# Patient Record
Sex: MALE | Race: OTHER | HISPANIC OR LATINO
[De-identification: names, ages, dates, MRNs, and addresses within clinical notes are randomized per-mention and may not be internally consistent; named-entity substitution may affect disease eponyms.]

---

## 2022-02-14 ENCOUNTER — NON-APPOINTMENT (OUTPATIENT)
Age: 42
End: 2022-02-14

## 2022-03-03 ENCOUNTER — TRANSCRIPTION ENCOUNTER (OUTPATIENT)
Age: 42
End: 2022-03-03

## 2022-03-15 ENCOUNTER — APPOINTMENT (OUTPATIENT)
Dept: PULMONOLOGY | Facility: CLINIC | Age: 42
End: 2022-03-15
Payer: COMMERCIAL

## 2022-03-15 DIAGNOSIS — I10 ESSENTIAL (PRIMARY) HYPERTENSION: ICD-10-CM

## 2022-03-15 DIAGNOSIS — E78.5 HYPERLIPIDEMIA, UNSPECIFIED: ICD-10-CM

## 2022-03-15 PROCEDURE — 99203 OFFICE O/P NEW LOW 30 MIN: CPT

## 2022-03-15 NOTE — ASSESSMENT
[FreeTextEntry1] : SOB: persistent SOB post- covid. - F/u PFTs with exhaled nitric oxide study, \par \par Cough: treat for PND, GERD for now. Start Zyal  and Flonase. Consider Ct chest if cough does not improve on these medications given  rheumatological symptoms r/o sarcoidosis. \par \par Lucía HUDSON NP, am scribing for and in the presence of Dr. Wesley Bridges, the following sections HISTORY OF PRESENT ILLNESS, PAST MEDICAL/FAMILY/SOCIAL HISTORY; REVIEW OF SYSTEMS; VITAL SIGNS; PHYSICAL EXAM; DISPOSITION.\par \par

## 2022-03-15 NOTE — HISTORY OF PRESENT ILLNESS
[Never] : never [TextBox_4] : Pt here for pulmonary eval of SOB and cough post Covid 19 infection (July 2020). Since his Covid infection, he has been on Qvar for cough and SOB x 1 year and recently ran out. Prior imaging of CXR has been negative. He SOB has improved but he is not at his baseline pre-Covid state. He c/o recurrent episode of wet cough upon awakening in the morning. Denies hx of pulmonary disease- he has had a home sleep studies for snoring which was reported normal. He has a hx of seasonal allergies which he ins on Allegra -D prn and + refulx symptoms on pantoprazole most days. \par \par \par He has seen rheumatology for gum swelling and redness with  non- specific granulomatous findings in gums and joint pains. All work up and eval done in [place] prior to moving to [place].\par \par Employed as pediatric pulmonologist.\par \par

## 2022-03-17 ENCOUNTER — APPOINTMENT (OUTPATIENT)
Dept: PULMONOLOGY | Facility: CLINIC | Age: 42
End: 2022-03-17
Payer: COMMERCIAL

## 2022-03-17 PROCEDURE — 94729 DIFFUSING CAPACITY: CPT

## 2022-03-17 PROCEDURE — 94010 BREATHING CAPACITY TEST: CPT

## 2022-03-17 PROCEDURE — 94726 PLETHYSMOGRAPHY LUNG VOLUMES: CPT

## 2022-03-17 PROCEDURE — 95012 NITRIC OXIDE EXP GAS DETER: CPT

## 2022-04-12 ENCOUNTER — APPOINTMENT (OUTPATIENT)
Dept: PULMONOLOGY | Facility: CLINIC | Age: 42
End: 2022-04-12
Payer: COMMERCIAL

## 2022-04-12 VITALS
WEIGHT: 180 LBS | RESPIRATION RATE: 16 BRPM | HEART RATE: 105 BPM | BODY MASS INDEX: 26.66 KG/M2 | SYSTOLIC BLOOD PRESSURE: 130 MMHG | OXYGEN SATURATION: 95 % | DIASTOLIC BLOOD PRESSURE: 83 MMHG | TEMPERATURE: 98 F | HEIGHT: 69 IN

## 2022-04-12 PROCEDURE — 99214 OFFICE O/P EST MOD 30 MIN: CPT

## 2022-04-12 NOTE — PHYSICAL EXAM
[No Acute Distress] : no acute distress [Normal Rate/Rhythm] : normal rate/rhythm [Normal S1, S2] : normal s1, s2 [No Resp Distress] : no resp distress [Clear to Auscultation Bilaterally] : clear to auscultation bilaterally [Oriented x3] : oriented x3

## 2022-04-12 NOTE — HISTORY OF PRESENT ILLNESS
[Never] : never [TextBox_4] : Pt here for follow up cough/ SOB- resumed Flovent for symptoms 2 days ago without notable difference in exercise tolerance/ sob. Able to run 2.5 miles the other day. Reports flonase has helped with his congestion but experiences some mild epistaxis.\par \par 3/15/2022:\par Pt here for pulmonary eval of SOB and cough post Covid 19 infection (July 2020). Since his Covid infection, he has been on Qvar for cough and SOB x 1 year and recently ran out. Prior imaging of CXR has been negative. He SOB has improved but he is not at his baseline pre-Covid state. He c/o recurrent episode of wet cough upon awakening in the morning. Denies hx of pulmonary disease- he has had a home sleep studies for snoring which was reported normal. He has a hx of seasonal allergies which he ins on Allegra -D prn and + refulx symptoms on pantoprazole most days. \par \par \par He has seen rheumatology for gum swelling and redness with  non- specific granulomatous findings in gums and joint pains. All work up and eval done in [place] prior to moving to [place].\par \par Employed as pediatric pulmonologist.\par \par

## 2022-04-12 NOTE — ASSESSMENT
[FreeTextEntry1] : Post Covid dyspnea: Con flovent, exercise as tolerated. . Exhaled nirtic oxide borderline normal= suggestive of atopic phenotype. Hold off on imaging for now. Con flonase for rhinitis/  PND- add nasal saline spray prn for dryness. \par \par RTC in 2 months\par \par I, Lucía Grover NP, am scribing for and in the presence of Dr. Wesley Bridges, the following sections HISTORY OF PRESENT ILLNESS, PAST MEDICAL/FAMILY/SOCIAL HISTORY; REVIEW OF SYSTEMS; VITAL SIGNS; PHYSICAL EXAM; DISPOSITION.\par

## 2022-06-14 ENCOUNTER — APPOINTMENT (OUTPATIENT)
Dept: PULMONOLOGY | Facility: CLINIC | Age: 42
End: 2022-06-14
Payer: COMMERCIAL

## 2022-06-14 VITALS
DIASTOLIC BLOOD PRESSURE: 80 MMHG | HEART RATE: 101 BPM | TEMPERATURE: 97 F | BODY MASS INDEX: 27.11 KG/M2 | SYSTOLIC BLOOD PRESSURE: 144 MMHG | OXYGEN SATURATION: 95 % | WEIGHT: 183 LBS | HEIGHT: 69 IN | RESPIRATION RATE: 16 BRPM

## 2022-06-14 PROCEDURE — 99214 OFFICE O/P EST MOD 30 MIN: CPT

## 2022-06-14 NOTE — ASSESSMENT
[FreeTextEntry1] : Asthma: Notes better control on Qvar in the past. Will d/c Flovent and obtain auth for Qvar\par \par SOB: persistent symptoms interfering with IADLs.Hx non- specific granulomatous findings in gums and joint pains- followed by rheumatology in [place]. Chest Xrays in the past have been normal throughout onset of symptoms  Will obtain cardiopulmonary stress test, CT chest for further eval. \par \par Snoring/ headaches- lab PSG study ordered for eval DELBERT\par \par Sinusitis: con Flonase. D/c AFrin. Refer to ENT\par \par I, Lucía Grover NP, am scribing for and in the presence of Dr. Wesley Bridges, the following sections HISTORY OF PRESENT ILLNESS, PAST MEDICAL/FAMILY/SOCIAL HISTORY; REVIEW OF SYSTEMS; VITAL SIGNS; PHYSICAL EXAM; DISPOSITION.\par \par \par

## 2022-06-14 NOTE — HISTORY OF PRESENT ILLNESS
[Never] : never [TextBox_4] : 41 y.o male PMH Asthma, HTN, HLD ,here for f/u asthma. Still c/o SOB with exertion, stair climbing. Denies chest pain, cough, wheeze. On Flovent without relief.  Also c/o nasal congestion- on Flonase QD, Afrin prn.  + heavy snoring, headache in morning-  home sleep study done in [place] prior to relocating to [place]\par \par

## 2022-06-22 ENCOUNTER — NON-APPOINTMENT (OUTPATIENT)
Age: 42
End: 2022-06-22

## 2022-07-05 ENCOUNTER — APPOINTMENT (OUTPATIENT)
Dept: OTOLARYNGOLOGY | Facility: CLINIC | Age: 42
End: 2022-07-05

## 2022-07-05 VITALS
HEIGHT: 69 IN | SYSTOLIC BLOOD PRESSURE: 144 MMHG | DIASTOLIC BLOOD PRESSURE: 77 MMHG | WEIGHT: 180 LBS | HEART RATE: 105 BPM | BODY MASS INDEX: 26.66 KG/M2

## 2022-07-05 DIAGNOSIS — Z87.09 PERSONAL HISTORY OF OTHER DISEASES OF THE RESPIRATORY SYSTEM: ICD-10-CM

## 2022-07-05 DIAGNOSIS — Z86.79 PERSONAL HISTORY OF OTHER DISEASES OF THE CIRCULATORY SYSTEM: ICD-10-CM

## 2022-07-05 DIAGNOSIS — Z78.9 OTHER SPECIFIED HEALTH STATUS: ICD-10-CM

## 2022-07-05 PROCEDURE — 31231 NASAL ENDOSCOPY DX: CPT

## 2022-07-05 PROCEDURE — 99204 OFFICE O/P NEW MOD 45 MIN: CPT | Mod: 25

## 2022-07-05 RX ORDER — PREDNISONE 20 MG/1
20 TABLET ORAL
Qty: 10 | Refills: 0 | Status: COMPLETED | COMMUNITY
Start: 2022-06-23

## 2022-07-05 RX ORDER — FLUTICASONE PROPIONATE 110 UG/1
110 AEROSOL, METERED RESPIRATORY (INHALATION) TWICE DAILY
Qty: 1 | Refills: 3 | Status: DISCONTINUED | COMMUNITY
Start: 2022-04-06 | End: 2022-07-05

## 2022-07-06 RX ORDER — ALBUTEROL SULFATE 90 UG/1
108 (90 BASE) INHALANT RESPIRATORY (INHALATION)
Qty: 1 | Refills: 2 | Status: ACTIVE | COMMUNITY
Start: 2022-07-06 | End: 1900-01-01

## 2022-07-11 NOTE — HISTORY OF PRESENT ILLNESS
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[employer] \par Longstanding history of nasal congestion- worse since COVID+ 07/2020 and 12/2021 \par Reports R>L constant nasal congestion, intermittent PND and throat clearing, \par Denies anterior rhinorrhea, facial pain/pressure, poor sense of smell, recurrent sinus infections \par Using nasal saline and Flonase for the past 3 months\par Recurrent epistaxis R>L, once every two weeks, resolves within 2 minutes with pressure \par Denies nasal trauma, cauterizations, related ER visits \par Denies imaging of sinus \par \par Hx snoring, home sleep study 2 years ago, states results were normal\par Pending new sleep study \par  \par PMH: Asthma, HTN, HLD

## 2022-08-01 ENCOUNTER — APPOINTMENT (OUTPATIENT)
Dept: PULMONOLOGY | Facility: CLINIC | Age: 42
End: 2022-08-01

## 2022-08-22 ENCOUNTER — APPOINTMENT (OUTPATIENT)
Dept: PULMONOLOGY | Facility: CLINIC | Age: 42
End: 2022-08-22

## 2022-10-13 ENCOUNTER — NON-APPOINTMENT (OUTPATIENT)
Age: 42
End: 2022-10-13

## 2022-10-23 ENCOUNTER — NON-APPOINTMENT (OUTPATIENT)
Age: 42
End: 2022-10-23

## 2022-11-07 ENCOUNTER — APPOINTMENT (OUTPATIENT)
Dept: PULMONOLOGY | Facility: CLINIC | Age: 42
End: 2022-11-07

## 2022-11-07 PROCEDURE — 94621 CARDIOPULM EXERCISE TESTING: CPT

## 2022-11-07 PROCEDURE — 94375 RESPIRATORY FLOW VOLUME LOOP: CPT

## 2023-03-07 ENCOUNTER — NON-APPOINTMENT (OUTPATIENT)
Age: 43
End: 2023-03-07

## 2023-03-10 ENCOUNTER — INPATIENT (INPATIENT)
Facility: HOSPITAL | Age: 43
LOS: 2 days | Discharge: HOME | End: 2023-03-13
Attending: STUDENT IN AN ORGANIZED HEALTH CARE EDUCATION/TRAINING PROGRAM | Admitting: STUDENT IN AN ORGANIZED HEALTH CARE EDUCATION/TRAINING PROGRAM
Payer: COMMERCIAL

## 2023-03-10 VITALS
WEIGHT: 178.57 LBS | DIASTOLIC BLOOD PRESSURE: 92 MMHG | TEMPERATURE: 98 F | HEART RATE: 115 BPM | RESPIRATION RATE: 16 BRPM | OXYGEN SATURATION: 100 % | SYSTOLIC BLOOD PRESSURE: 148 MMHG

## 2023-03-10 DIAGNOSIS — R06.09 OTHER FORMS OF DYSPNEA: ICD-10-CM

## 2023-03-10 LAB
ALBUMIN SERPL ELPH-MCNC: 4.3 G/DL — SIGNIFICANT CHANGE UP (ref 3.3–5)
ALP SERPL-CCNC: 77 U/L — SIGNIFICANT CHANGE UP (ref 40–120)
ALT FLD-CCNC: 32 U/L — SIGNIFICANT CHANGE UP (ref 4–41)
ANION GAP SERPL CALC-SCNC: 12 MMOL/L — SIGNIFICANT CHANGE UP (ref 7–14)
APTT BLD: 30.3 SEC — SIGNIFICANT CHANGE UP (ref 27–36.3)
AST SERPL-CCNC: 25 U/L — SIGNIFICANT CHANGE UP (ref 4–40)
BASOPHILS # BLD AUTO: 0.03 K/UL — SIGNIFICANT CHANGE UP (ref 0–0.2)
BASOPHILS NFR BLD AUTO: 0.2 % — SIGNIFICANT CHANGE UP (ref 0–2)
BILIRUB SERPL-MCNC: 0.5 MG/DL — SIGNIFICANT CHANGE UP (ref 0.2–1.2)
BUN SERPL-MCNC: 8 MG/DL — SIGNIFICANT CHANGE UP (ref 7–23)
CALCIUM SERPL-MCNC: 9.6 MG/DL — SIGNIFICANT CHANGE UP (ref 8.4–10.5)
CHLORIDE SERPL-SCNC: 103 MMOL/L — SIGNIFICANT CHANGE UP (ref 98–107)
CK MB BLD-MCNC: 6.1 % — HIGH (ref 0–2.5)
CK MB BLD-MCNC: 7.7 % — HIGH (ref 0–2.5)
CK MB CFR SERPL CALC: 12.4 NG/ML — HIGH
CK MB CFR SERPL CALC: 7.9 NG/ML — HIGH
CK SERPL-CCNC: 129 U/L — SIGNIFICANT CHANGE UP (ref 30–200)
CK SERPL-CCNC: 162 U/L — SIGNIFICANT CHANGE UP (ref 30–200)
CO2 SERPL-SCNC: 23 MMOL/L — SIGNIFICANT CHANGE UP (ref 22–31)
CREAT SERPL-MCNC: 0.85 MG/DL — SIGNIFICANT CHANGE UP (ref 0.5–1.3)
D DIMER BLD IA.RAPID-MCNC: <150 NG/ML DDU — SIGNIFICANT CHANGE UP
EGFR: 112 ML/MIN/1.73M2 — SIGNIFICANT CHANGE UP
EOSINOPHIL # BLD AUTO: 0.07 K/UL — SIGNIFICANT CHANGE UP (ref 0–0.5)
EOSINOPHIL NFR BLD AUTO: 0.5 % — SIGNIFICANT CHANGE UP (ref 0–6)
FLUAV AG NPH QL: SIGNIFICANT CHANGE UP
FLUBV AG NPH QL: SIGNIFICANT CHANGE UP
GLUCOSE SERPL-MCNC: 97 MG/DL — SIGNIFICANT CHANGE UP (ref 70–99)
HCT VFR BLD CALC: 45.2 % — SIGNIFICANT CHANGE UP (ref 39–50)
HGB BLD-MCNC: 14.5 G/DL — SIGNIFICANT CHANGE UP (ref 13–17)
IANC: 11.26 K/UL — HIGH (ref 1.8–7.4)
IMM GRANULOCYTES NFR BLD AUTO: 0.6 % — SIGNIFICANT CHANGE UP (ref 0–0.9)
INR BLD: 1.16 RATIO — SIGNIFICANT CHANGE UP (ref 0.88–1.16)
LYMPHOCYTES # BLD AUTO: 1.71 K/UL — SIGNIFICANT CHANGE UP (ref 1–3.3)
LYMPHOCYTES # BLD AUTO: 12.4 % — LOW (ref 13–44)
MCHC RBC-ENTMCNC: 30.4 PG — SIGNIFICANT CHANGE UP (ref 27–34)
MCHC RBC-ENTMCNC: 32.1 GM/DL — SIGNIFICANT CHANGE UP (ref 32–36)
MCV RBC AUTO: 94.8 FL — SIGNIFICANT CHANGE UP (ref 80–100)
MONOCYTES # BLD AUTO: 0.59 K/UL — SIGNIFICANT CHANGE UP (ref 0–0.9)
MONOCYTES NFR BLD AUTO: 4.3 % — SIGNIFICANT CHANGE UP (ref 2–14)
NEUTROPHILS # BLD AUTO: 11.26 K/UL — HIGH (ref 1.8–7.4)
NEUTROPHILS NFR BLD AUTO: 82 % — HIGH (ref 43–77)
NRBC # BLD: 0 /100 WBCS — SIGNIFICANT CHANGE UP (ref 0–0)
NRBC # FLD: 0 K/UL — SIGNIFICANT CHANGE UP (ref 0–0)
NT-PROBNP SERPL-SCNC: 292 PG/ML — SIGNIFICANT CHANGE UP
PLATELET # BLD AUTO: 265 K/UL — SIGNIFICANT CHANGE UP (ref 150–400)
POTASSIUM SERPL-MCNC: 3.6 MMOL/L — SIGNIFICANT CHANGE UP (ref 3.5–5.3)
POTASSIUM SERPL-SCNC: 3.6 MMOL/L — SIGNIFICANT CHANGE UP (ref 3.5–5.3)
PROT SERPL-MCNC: 7.8 G/DL — SIGNIFICANT CHANGE UP (ref 6–8.3)
PROTHROM AB SERPL-ACNC: 13.5 SEC — HIGH (ref 10.5–13.4)
RBC # BLD: 4.77 M/UL — SIGNIFICANT CHANGE UP (ref 4.2–5.8)
RBC # FLD: 13.4 % — SIGNIFICANT CHANGE UP (ref 10.3–14.5)
RSV RNA NPH QL NAA+NON-PROBE: SIGNIFICANT CHANGE UP
SARS-COV-2 RNA SPEC QL NAA+PROBE: SIGNIFICANT CHANGE UP
SODIUM SERPL-SCNC: 138 MMOL/L — SIGNIFICANT CHANGE UP (ref 135–145)
TROPONIN T, HIGH SENSITIVITY RESULT: 129 NG/L — CRITICAL HIGH
TROPONIN T, HIGH SENSITIVITY RESULT: 152 NG/L — CRITICAL HIGH
TROPONIN T, HIGH SENSITIVITY RESULT: 154 NG/L — CRITICAL HIGH
WBC # BLD: 13.74 K/UL — HIGH (ref 3.8–10.5)
WBC # FLD AUTO: 13.74 K/UL — HIGH (ref 3.8–10.5)

## 2023-03-10 PROCEDURE — 93306 TTE W/DOPPLER COMPLETE: CPT | Mod: 26

## 2023-03-10 PROCEDURE — 99222 1ST HOSP IP/OBS MODERATE 55: CPT

## 2023-03-10 PROCEDURE — 71046 X-RAY EXAM CHEST 2 VIEWS: CPT | Mod: 26

## 2023-03-10 PROCEDURE — 99292 CRITICAL CARE ADDL 30 MIN: CPT

## 2023-03-10 PROCEDURE — 99223 1ST HOSP IP/OBS HIGH 75: CPT

## 2023-03-10 PROCEDURE — 99291 CRITICAL CARE FIRST HOUR: CPT

## 2023-03-10 PROCEDURE — 71275 CT ANGIOGRAPHY CHEST: CPT | Mod: 26,MA

## 2023-03-10 RX ORDER — BUDESONIDE AND FORMOTEROL FUMARATE DIHYDRATE 160; 4.5 UG/1; UG/1
2 AEROSOL RESPIRATORY (INHALATION)
Refills: 0 | Status: DISCONTINUED | OUTPATIENT
Start: 2023-03-10 | End: 2023-03-13

## 2023-03-10 RX ORDER — HEPARIN SODIUM 5000 [USP'U]/ML
INJECTION INTRAVENOUS; SUBCUTANEOUS
Qty: 25000 | Refills: 0 | Status: DISCONTINUED | OUTPATIENT
Start: 2023-03-10 | End: 2023-03-11

## 2023-03-10 RX ORDER — ASPIRIN/CALCIUM CARB/MAGNESIUM 324 MG
324 TABLET ORAL ONCE
Refills: 0 | Status: COMPLETED | OUTPATIENT
Start: 2023-03-10 | End: 2023-03-10

## 2023-03-10 RX ORDER — ACETAMINOPHEN 500 MG
650 TABLET ORAL EVERY 6 HOURS
Refills: 0 | Status: DISCONTINUED | OUTPATIENT
Start: 2023-03-10 | End: 2023-03-13

## 2023-03-10 RX ORDER — IPRATROPIUM/ALBUTEROL SULFATE 18-103MCG
3 AEROSOL WITH ADAPTER (GRAM) INHALATION ONCE
Refills: 0 | Status: COMPLETED | OUTPATIENT
Start: 2023-03-10 | End: 2023-03-10

## 2023-03-10 RX ORDER — LANOLIN ALCOHOL/MO/W.PET/CERES
3 CREAM (GRAM) TOPICAL AT BEDTIME
Refills: 0 | Status: DISCONTINUED | OUTPATIENT
Start: 2023-03-10 | End: 2023-03-13

## 2023-03-10 RX ORDER — HEPARIN SODIUM 5000 [USP'U]/ML
5000 INJECTION INTRAVENOUS; SUBCUTANEOUS EVERY 6 HOURS
Refills: 0 | Status: DISCONTINUED | OUTPATIENT
Start: 2023-03-10 | End: 2023-03-11

## 2023-03-10 RX ORDER — METOCLOPRAMIDE HCL 10 MG
10 TABLET ORAL ONCE
Refills: 0 | Status: COMPLETED | OUTPATIENT
Start: 2023-03-10 | End: 2023-03-10

## 2023-03-10 RX ORDER — HEPARIN SODIUM 5000 [USP'U]/ML
5000 INJECTION INTRAVENOUS; SUBCUTANEOUS ONCE
Refills: 0 | Status: COMPLETED | OUTPATIENT
Start: 2023-03-10 | End: 2023-03-10

## 2023-03-10 RX ORDER — ALBUTEROL 90 UG/1
2.5 AEROSOL, METERED ORAL EVERY 6 HOURS
Refills: 0 | Status: DISCONTINUED | OUTPATIENT
Start: 2023-03-10 | End: 2023-03-13

## 2023-03-10 RX ORDER — CEFTRIAXONE 500 MG/1
1000 INJECTION, POWDER, FOR SOLUTION INTRAMUSCULAR; INTRAVENOUS ONCE
Refills: 0 | Status: COMPLETED | OUTPATIENT
Start: 2023-03-10 | End: 2023-03-10

## 2023-03-10 RX ORDER — BNT162B2 ORIGINAL AND OMICRON BA.4/BA.5 .1125; .1125 MG/2.25ML; MG/2.25ML
0.3 INJECTION, SUSPENSION INTRAMUSCULAR ONCE
Refills: 0 | Status: DISCONTINUED | OUTPATIENT
Start: 2023-03-10 | End: 2023-03-13

## 2023-03-10 RX ORDER — ALBUTEROL 90 UG/1
3 AEROSOL, METERED ORAL
Qty: 120 | Refills: 0
Start: 2023-03-10 | End: 2023-03-19

## 2023-03-10 RX ADMIN — Medication 3 MILLILITER(S): at 11:57

## 2023-03-10 RX ADMIN — HEPARIN SODIUM 5000 UNIT(S): 5000 INJECTION INTRAVENOUS; SUBCUTANEOUS at 19:01

## 2023-03-10 RX ADMIN — Medication 3 MILLILITER(S): at 14:25

## 2023-03-10 RX ADMIN — Medication 324 MILLIGRAM(S): at 14:13

## 2023-03-10 RX ADMIN — Medication 10 MILLIGRAM(S): at 12:46

## 2023-03-10 RX ADMIN — HEPARIN SODIUM 1000 UNIT(S)/HR: 5000 INJECTION INTRAVENOUS; SUBCUTANEOUS at 19:00

## 2023-03-10 RX ADMIN — CEFTRIAXONE 100 MILLIGRAM(S): 500 INJECTION, POWDER, FOR SOLUTION INTRAMUSCULAR; INTRAVENOUS at 18:49

## 2023-03-10 NOTE — PATIENT PROFILE ADULT - FALL HARM RISK - UNIVERSAL INTERVENTIONS
Bed in lowest position, wheels locked, appropriate side rails in place/Call bell, personal items and telephone in reach/Instruct patient to call for assistance before getting out of bed or chair/Non-slip footwear when patient is out of bed/[place] to call system/Physically safe environment - no spills, clutter or unnecessary equipment/Purposeful Proactive Rounding/Room/bathroom lighting operational, light cord in reach

## 2023-03-10 NOTE — ED PROVIDER NOTE - NS ED ATTENDING STATEMENT MOD
This was a shared visit with the NAY. I reviewed and verified the documentation and independently performed the documented: I have personally provided the amount of critical care time documented below excluding time spent on separate procedures.

## 2023-03-10 NOTE — ED PROVIDER NOTE - CRITICAL CARE ATTENDING CONTRIBUTION TO CARE
I have personally performed a history and physical examination of the patient and discussed management with the NAY as well as the patient.  I reviewed the NAY's note and agree with the documented findings and plan of care.  I have authored and modified critical sections of the Provider Note, including but not limited to HPI, Physical Exam and MDM.    43 y/o male no pmh c/o sob and mild chest tightness x3 days. Pt states that he had covid in 2020 and since then has had intermittent lung issues. Pt takes albuterol and Symbicort as needed. Pt states that over the last 3 days symptoms became worse, now w/ GUZMAN when walking around his apartment or moving in bed, which is new. Pt trteid 6 muffs of albuterol today with no effect. Pt has a pulse ox and noted his HR to be in the 115-120 range persistently and O2 95%. 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  Independent review of EKG shows sinus tachycardia without STEMI or T or TWI.  Possible COVID-related symptoms versus pneumonia versus cardiac etiology including CHF versus ACS less likely.  No additional symptoms of heart failure.  Will obtain CBC, CMP, TNI, BNP, CXR, symptomatic control as needed.  POCUS suggestive of bilateral trace pleural effusion.  No evidence of pericardial effusion, regional wall abnormality, Mandel sign,  or valve regurgitation.  Low suspicion PE, will obtain D-dimer.    Independent review of chest x-ray suspicious for right middle lobar focal consolidation.  We will treat patient with antibiotics.  Given patient's profession will treat with doxycycline for additional gram-positive coverage.    Independent review of lab results shows no elevation of BNP, mildly elevated white count of 13.7 with left shift neutrophilia at 82% and no bandemia.  D-dimer negative, troponin elevated to 154.  Possible NSTEMI versus myocardial strain due to pneumonia.  Case discussed with cardiology and to be evaluated at bedside.  Upon further discussion patient has paternal history of breast cancer.  Will obtain CT chest with IV contrast to screen for malignancy versus complicated loculation.    Case discussed with cardiology.  Possible NSTEMI with a CTA however requesting CTA to screen for PE.  Will hold antibiotics at this time until confirmation.  Independent review of CT imaging showing just dyspnea bilateral pleural effusions with atelectasis.  No evidence of elevation or pneumonia as per radiology report.  Will admit patient for ACS until proven otherwise, cardiology documentation stating pulmonary pathology at this time however for patient's safety will start heparin and admit.

## 2023-03-10 NOTE — CONSULT NOTE ADULT - ASSESSMENT
43 y/o male no pmh c/o sob and mild chest tightness x3 days. Has had shortness of breath as well as fevers and myalgias. Troponin slightly elevated with no ischemic EKG changes.    #Elevated troponin  -Most likely in the setting of pulmonary pathology/pneumonia  -Patient without chest pain, ACS unlikely at this time  -Please obtain repeat troponin to ensure plateau or downtrend  -No further cardiac workup  -Outpatient echo, please give patient appointment with outpatient cardiology [phone]    Kamron Cornelius MD  Cardiology fellow 41 y/o male no pmh c/o sob and mild chest tightness x3 days. Has had shortness of breath as well as fevers and myalgias. Troponin slightly elevated with no ischemic EKG changes.    #Elevated troponin  #Shortness of breath  Most likely in the setting of pulmonary pathology/pneumonia but PE remains high on the differential  -Patient without chest pain, ACS unlikely at this time  -Please obtain CT PE protocol to rule out PE and to assess for pulmonary pathology  -Please obtain TTE, I spoke with echo techs to expedite  -IF both studies normal, ok to be discharged from cardiology perspective  -Please give patient appointment with outpatient cardiology [phone]    Kamron Cornelius MD  Cardiology fellow

## 2023-03-10 NOTE — ED ADULT NURSE NOTE - CHIEF COMPLAINT QUOTE
Pt c/o sob, cough, fever, chest discomfort and headache x 3 days; seen at [facility] on Wed. test neg.  Pt took motrin at 7am

## 2023-03-10 NOTE — ED ADULT NURSE REASSESSMENT NOTE - NS ED NURSE REASSESS COMMENT FT1
Patient resting in stretcher, no acute distress noted at this time. Patient updated on plan of care, will continue to monitor.
Patient resting in stretcher, no acute distress noted at this time. Patient updated on plan of care, will continue to monitor.

## 2023-03-10 NOTE — CONSULT NOTE ADULT - SUBJECTIVE AND OBJECTIVE BOX
Patient seen and evaluated at bedside    Chief Complaint: Shortness of breath    HPI:    43 y/o male no pmh c/o sob and mild chest tightness x3 days. Pt states that he had covid in 2020 and since then has had intermittent lung issues. Pt takes albuterol and Symbicort as needed. Pt states that over the last 3 days symptoms became worse, now w/ GUZMAN when walking around his apartment or moving in bed, which is new. Pt trteid 6 muffs of albuterol today with no effect. Pt has a pulse ox and noted his HR to be in the 115-120 range persistently and O2 95%. 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. 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      PMHx:   No pertinent past medical history        PSHx:   No significant past surgical history        Allergies:  No Known Allergies      Home Meds:  No pertinent past medical history        Current Medications:       FAMILY HISTORY:      Social History:  Smoking History: Never smoker    REVIEW OF SYSTEMS:  Constitutional:     [ ] negative [ ] fevers [ ] chills [ ] weight loss [ ] weight gain  HEENT:                  [ ] negative [ ] dry eyes [ ] eye irritation [ ] postnasal drip [ ] nasal congestion  CV:                         [ ] negative  [ ] chest pain [ ] orthopnea [ ] palpitations [ ] murmur  Resp:                     [ ] negative [ ] cough [ ] shortness of breath [ ] dyspnea [ ] wheezing [ ] sputum [ ]hemoptysis  GI:                          [ ] negative [ ] nausea [ ] vomiting [ ] diarrhea [ ] constipation [ ] abd pain [ ] dysphagia   :                        [ ] negative [ ] dysuria [ ] nocturia [ ] hematuria [ ] increased urinary frequency  Musculoskeletal: [ ] negative [ ] back pain [ ] myalgias [ ] arthralgias [ ] fracture  Skin:                       [ ] negative [ ] rash [ ] itch  Neurological:        [ ] negative [ ] headache [ ] dizziness [ ] syncope [ ] weakness [ ] numbness  Psychiatric:           [ ] negative [ ] anxiety [ ] depression  Endocrine:            [ ] negative [ ] diabetes [ ] thyroid problem  Heme/Lymph:      [ ] negative [ ] anemia [ ] bleeding problem  Allergic/Immune: [ ] negative [ ] itchy eyes [ ] nasal discharge [ ] hives [ ] angioedema    [ ] All other systems negative  [ ] Unable to assess ROS due to      Physical Exam:  T(F): 98 (03-10), Max: 98.2 (03-10)  HR: 110 (03-10) (110 - 115)  BP: 146/87 (03-10) (146/87 - 148/92)  RR: 20 (03-10)  SpO2: 97% (03-10)  GENERAL: No acute distress, well-developed  HEAD:  Atraumatic, Normocephalic  ENT: EOMI, PERRLA, conjunctiva and sclera clear, Neck supple, No JVD, moist mucosa  CHEST/LUNG: Clear to auscultation bilaterally; No wheeze, equal breath sounds bilaterally   BACK: No spinal tenderness  HEART: Regular rate and rhythm; No murmurs, rubs, or gallops  ABDOMEN: Soft, Nontender, Nondistended; Bowel sounds present  EXTREMITIES:  No clubbing, cyanosis, or edema      Cardiovascular Diagnostic Testing:    ECG: Personally reviewed:  sinus tachycardia with no St segment elevation or T wave inversion        Imaging:    CXR: Personally reviewed    Labs: Personally reviewed                        14.5   13.74 )-----------( 265      ( 10 Mar 2023 11:55 )             45.2     03-10    138  |  103  |  8   ----------------------------<  97  3.6   |  23  |  0.85    Ca    9.6      10 Mar 2023 11:55    TPro  7.8  /  Alb  4.3  /  TBili  0.5  /  DBili  x   /  AST  25  /  ALT  32  /  AlkPhos  77  03-10      CARDIAC MARKERS ( 10 Mar 2023 11:55 )  154 ng/L / x     / x     / x     / x     / x          No Known Allergies      T(F): 98 (03-10), Max: 98.2 (03-10)  HR: 110 (03-10) (110 - 115)  BP: 146/87 (03-10) (146/87 - 148/92)  RR: 20 (03-10)  SpO2: 97% (03-10) Patient seen and evaluated at bedside    Chief Complaint: Shortness of breath    HPI:    41 y/o male no pmh c/o sob and mild chest tightness x3 days. Pt states that he had covid in 2020 and since then has had intermittent lung issues. Pt takes albuterol and Symbicort as needed. Pt states that over the last 3 days symptoms became worse, now w/ GUZMAN when walking around his apartment or moving in bed, which is new. Pt tried 6 puffs of albuterol today with no effect. Pt has a pulse ox and noted his HR to be in the 115-120 range persistently and O2 95%. 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  Patient has not had any changes in exercise to[place]. Denies orthopnea, PND or LE edema      PMHx:   No pertinent past medical history        PSHx:   No significant past surgical history        Allergies:  No Known Allergies      Home Meds:  No pertinent past medical history        Current Medications:       FAMILY HISTORY:      Social History:  Smoking History: Never smoker    REVIEW OF SYSTEMS:  Constitutional:     [ ] negative [ ] fevers [ ] chills [ ] weight loss [ ] weight gain  HEENT:                  [ ] negative [ ] dry eyes [ ] eye irritation [ ] postnasal drip [ ] nasal congestion  CV:                         [ ] negative  [ ] chest pain [ ] orthopnea [ ] palpitations [ ] murmur  Resp:                     [ ] negative [ ] cough [ ] shortness of breath [ ] dyspnea [ ] wheezing [ ] sputum [ ]hemoptysis  GI:                          [ ] negative [ ] nausea [ ] vomiting [ ] diarrhea [ ] constipation [ ] abd pain [ ] dysphagia   :                        [ ] negative [ ] dysuria [ ] nocturia [ ] hematuria [ ] increased urinary frequency  Musculoskeletal: [ ] negative [ ] back pain [ ] myalgias [ ] arthralgias [ ] fracture  Skin:                       [ ] negative [ ] rash [ ] itch  Neurological:        [ ] negative [ ] headache [ ] dizziness [ ] syncope [ ] weakness [ ] numbness  Psychiatric:           [ ] negative [ ] anxiety [ ] depression  Endocrine:            [ ] negative [ ] diabetes [ ] thyroid problem  Heme/Lymph:      [ ] negative [ ] anemia [ ] bleeding problem  Allergic/Immune: [ ] negative [ ] itchy eyes [ ] nasal discharge [ ] hives [ ] angioedema    [ ] All other systems negative  [ ] Unable to assess ROS due to      Physical Exam:  T(F): 98 (03-10), Max: 98.2 (03-10)  HR: 110 (03-10) (110 - 115)  BP: 146/87 (03-10) (146/87 - 148/92)  RR: 20 (03-10)  SpO2: 97% (03-10)  GENERAL: No acute distress, well-developed  HEAD:  Atraumatic, Normocephalic  ENT: EOMI, PERRLA, conjunctiva and sclera clear, Neck supple, No JVD, moist mucosa  CHEST/LUNG: Clear to auscultation bilaterally; No wheeze, equal breath sounds bilaterally   BACK: No spinal tenderness  HEART: Regular rate and rhythm; No murmurs, rubs, or gallops  ABDOMEN: Soft, Nontender, Nondistended; Bowel sounds present  EXTREMITIES:  Right extremity slightly bigger than left      Cardiovascular Diagnostic Testing:    ECG: Personally reviewed:  sinus tachycardia with no St segment elevation or T wave inversion        Imaging:    CXR: Personally reviewed    Labs: Personally reviewed                        14.5   13.74 )-----------( 265      ( 10 Mar 2023 11:55 )             45.2     03-10    138  |  103  |  8   ----------------------------<  97  3.6   |  23  |  0.85    Ca    9.6      10 Mar 2023 11:55    TPro  7.8  /  Alb  4.3  /  TBili  0.5  /  DBili  x   /  AST  25  /  ALT  32  /  AlkPhos  77  03-10      CARDIAC MARKERS ( 10 Mar 2023 11:55 )  154 ng/L / x     / x     / x     / x     / x          No Known Allergies      T(F): 98 (03-10), Max: 98.2 (03-10)  HR: 110 (03-10) (110 - 115)  BP: 146/87 (03-10) (146/87 - 148/92)  RR: 20 (03-10)  SpO2: 97% (03-10)

## 2023-03-10 NOTE — ED PROVIDER NOTE - PROGRESS NOTE DETAILS
PA Rice- POCUS echo LV has great squeeze no pericaridal effusion no signs of RV strain. Lungs have no b-lines, but + b/l small pleural effusions.

## 2023-03-10 NOTE — ED PROVIDER NOTE - CARE PLAN
1 Principal Discharge DX:	GUZMAN (dyspnea on exertion)  Secondary Diagnosis:	NSTEMI (non-ST elevation myocardial infarction)

## 2023-03-10 NOTE — ED ADULT NURSE NOTE - OBJECTIVE STATEMENT
Patient A&o X4, received in intake, with complaints of SOB/CP. Patient states, "for the past 3 days I have been increasingly SOB ". Patient denies SOB being worse on exertion, denies taking any medication pta. Patient also complains of having a cough and low grade fever at home, unsure of home temperature. Patient able to speak in clear sentences, respirations equal and unlabored. Lung sounds clear b/l, equal chest rise and fall noted. Denies chills, nausea, vomiting and diarrhea at this time. Skin warm and dry. Provider at bedside for eval, pending further orders.

## 2023-03-10 NOTE — CONSULT NOTE ADULT - ATTENDING COMMENTS
Echo. CTA.  1. PE is highest on differential.  2. Possible brayden/pericarditis.  3. Very low suspicion for ACS.

## 2023-03-10 NOTE — ED PROVIDER NOTE - OBJECTIVE STATEMENT
43 y/o male no pmh c/o sob and mild chest tightness x3 days. Pt states that he had covid in 2020 and since then has had intermittent lung issues. Pt takes albuterol and Symbicort as needed. Pt states that over the last 3 days symptoms became worse, now w/ GUZMAN when walking around his apartment or moving in bed, which is new. Pt trteid 6 muffs of albuterol today with no effect. Pt has a pulse ox and noted his HR to be in the 115-120 range persistently and O2 95%. 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

## 2023-03-10 NOTE — ED PROVIDER NOTE - ATTENDING APP SHARED VISIT CONTRIBUTION OF CARE
I have personally performed a history and physical examination of the patient and discussed management with the NAY as well as the patient.  I reviewed the NAY's note and agree with the documented findings and plan of care.  I have authored and modified critical sections of the Provider Note, including but not limited to HPI, Physical Exam and MDM.    43 y/o male no pmh c/o sob and mild chest tightness x3 days. Pt states that he had covid in 2020 and since then has had intermittent lung issues. Pt takes albuterol and Symbicort as needed. Pt states that over the last 3 days symptoms became worse, now w/ GUZMAN when walking around his apartment or moving in bed, which is new. Pt trteid 6 muffs of albuterol today with no effect. Pt has a pulse ox and noted his HR to be in the 115-120 range persistently and O2 95%. 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  Independent review of EKG shows sinus tachycardia without STEMI or T or TWI.  Possible COVID-related symptoms versus pneumonia versus cardiac etiology including CHF versus ACS less likely.  No additional symptoms of heart failure.  Will obtain CBC, CMP, TNI, BNP, CXR, symptomatic control as needed.  POCUS suggestive of bilateral trace pleural effusion.  No evidence of pericardial effusion, regional wall abnormality, Mandel sign,  or valve regurgitation.  Low suspicion PE, will obtain D-dimer.    Independent review of chest x-ray suspicious for right middle lobar focal consolidation.  We will treat patient with antibiotics.  Given patient's profession will treat with doxycycline for additional gram-positive coverage.    Independent review of lab results shows no elevation of BNP, mildly elevated white count of 13.7 with left shift neutrophilia at 82% and no bandemia.  Due to pneumonia.  D-dimer negative, troponin elevated to 154.  Possible NSTEMI versus myocardial strain due to pneumonia.  Case discussed with cardiology and to be evaluated at bedside.  Upon further discussion patient has paternal history of breast cancer.  Will obtain CT chest with IV contrast to screen for malignancy versus complicated loculation.

## 2023-03-10 NOTE — ED ADULT TRIAGE NOTE - CHIEF COMPLAINT QUOTE
Pt c/o sob, cough, fever, chest discomfort and headache x 3 days; seen at [facility] on Wed. test neg.  Pt took motrin at 7am

## 2023-03-10 NOTE — ED PROVIDER NOTE - CLINICAL SUMMARY MEDICAL DECISION MAKING FREE TEXT BOX
43 y/o male pmh reactive airway disease 2/2 covid, c/o worsening GUZMAN, chest tightness and subjective fever x 3 days- concern for reactive airway disease vs PNA vs viral URI vs PE vs ACS vs CHF- will obtain labs, trop, bnp, d-dimer, cxr, duonebs and reassess.

## 2023-03-11 DIAGNOSIS — R06.02 SHORTNESS OF BREATH: ICD-10-CM

## 2023-03-11 DIAGNOSIS — R77.8 OTHER SPECIFIED ABNORMALITIES OF PLASMA PROTEINS: ICD-10-CM

## 2023-03-11 DIAGNOSIS — I21.4 NON-ST ELEVATION (NSTEMI) MYOCARDIAL INFARCTION: ICD-10-CM

## 2023-03-11 DIAGNOSIS — Z29.9 ENCOUNTER FOR PROPHYLACTIC MEASURES, UNSPECIFIED: ICD-10-CM

## 2023-03-11 LAB
ALBUMIN SERPL ELPH-MCNC: 4 G/DL — SIGNIFICANT CHANGE UP (ref 3.3–5)
ALP SERPL-CCNC: 79 U/L — SIGNIFICANT CHANGE UP (ref 40–120)
ALT FLD-CCNC: 29 U/L — SIGNIFICANT CHANGE UP (ref 4–41)
ANION GAP SERPL CALC-SCNC: 11 MMOL/L — SIGNIFICANT CHANGE UP (ref 7–14)
APTT BLD: 129.4 SEC — CRITICAL HIGH (ref 27–36.3)
APTT BLD: 31.8 SEC — SIGNIFICANT CHANGE UP (ref 27–36.3)
AST SERPL-CCNC: 28 U/L — SIGNIFICANT CHANGE UP (ref 4–40)
B PERT DNA SPEC QL NAA+PROBE: SIGNIFICANT CHANGE UP
B PERT+PARAPERT DNA PNL SPEC NAA+PROBE: SIGNIFICANT CHANGE UP
BILIRUB SERPL-MCNC: 0.3 MG/DL — SIGNIFICANT CHANGE UP (ref 0.2–1.2)
BORDETELLA PARAPERTUSSIS (RAPRVP): SIGNIFICANT CHANGE UP
BUN SERPL-MCNC: 14 MG/DL — SIGNIFICANT CHANGE UP (ref 7–23)
C PNEUM DNA SPEC QL NAA+PROBE: SIGNIFICANT CHANGE UP
CALCIUM SERPL-MCNC: 9.6 MG/DL — SIGNIFICANT CHANGE UP (ref 8.4–10.5)
CHLORIDE SERPL-SCNC: 108 MMOL/L — HIGH (ref 98–107)
CHOLEST SERPL-MCNC: 183 MG/DL — SIGNIFICANT CHANGE UP
CO2 SERPL-SCNC: 22 MMOL/L — SIGNIFICANT CHANGE UP (ref 22–31)
CREAT SERPL-MCNC: 0.84 MG/DL — SIGNIFICANT CHANGE UP (ref 0.5–1.3)
EGFR: 112 ML/MIN/1.73M2 — SIGNIFICANT CHANGE UP
FLUAV SUBTYP SPEC NAA+PROBE: SIGNIFICANT CHANGE UP
FLUBV RNA SPEC QL NAA+PROBE: SIGNIFICANT CHANGE UP
GLUCOSE SERPL-MCNC: 116 MG/DL — HIGH (ref 70–99)
HADV DNA SPEC QL NAA+PROBE: SIGNIFICANT CHANGE UP
HCOV 229E RNA SPEC QL NAA+PROBE: SIGNIFICANT CHANGE UP
HCOV HKU1 RNA SPEC QL NAA+PROBE: SIGNIFICANT CHANGE UP
HCOV NL63 RNA SPEC QL NAA+PROBE: SIGNIFICANT CHANGE UP
HCOV OC43 RNA SPEC QL NAA+PROBE: SIGNIFICANT CHANGE UP
HCT VFR BLD CALC: 42.2 % — SIGNIFICANT CHANGE UP (ref 39–50)
HDLC SERPL-MCNC: 51 MG/DL — SIGNIFICANT CHANGE UP
HGB BLD-MCNC: 14.1 G/DL — SIGNIFICANT CHANGE UP (ref 13–17)
HMPV RNA SPEC QL NAA+PROBE: SIGNIFICANT CHANGE UP
HPIV1 RNA SPEC QL NAA+PROBE: SIGNIFICANT CHANGE UP
HPIV2 RNA SPEC QL NAA+PROBE: SIGNIFICANT CHANGE UP
HPIV3 RNA SPEC QL NAA+PROBE: SIGNIFICANT CHANGE UP
HPIV4 RNA SPEC QL NAA+PROBE: SIGNIFICANT CHANGE UP
LIPID PNL WITH DIRECT LDL SERPL: 110 MG/DL — HIGH
M PNEUMO DNA SPEC QL NAA+PROBE: SIGNIFICANT CHANGE UP
MCHC RBC-ENTMCNC: 30.7 PG — SIGNIFICANT CHANGE UP (ref 27–34)
MCHC RBC-ENTMCNC: 33.4 GM/DL — SIGNIFICANT CHANGE UP (ref 32–36)
MCV RBC AUTO: 91.9 FL — SIGNIFICANT CHANGE UP (ref 80–100)
NON HDL CHOLESTEROL: 132 MG/DL — HIGH
NRBC # BLD: 0 /100 WBCS — SIGNIFICANT CHANGE UP (ref 0–0)
NRBC # FLD: 0 K/UL — SIGNIFICANT CHANGE UP (ref 0–0)
PLATELET # BLD AUTO: 263 K/UL — SIGNIFICANT CHANGE UP (ref 150–400)
POTASSIUM SERPL-MCNC: 3.9 MMOL/L — SIGNIFICANT CHANGE UP (ref 3.5–5.3)
POTASSIUM SERPL-SCNC: 3.9 MMOL/L — SIGNIFICANT CHANGE UP (ref 3.5–5.3)
PROT SERPL-MCNC: 7.4 G/DL — SIGNIFICANT CHANGE UP (ref 6–8.3)
RAPID RVP RESULT: SIGNIFICANT CHANGE UP
RBC # BLD: 4.59 M/UL — SIGNIFICANT CHANGE UP (ref 4.2–5.8)
RBC # FLD: 13.2 % — SIGNIFICANT CHANGE UP (ref 10.3–14.5)
RSV RNA SPEC QL NAA+PROBE: SIGNIFICANT CHANGE UP
RV+EV RNA SPEC QL NAA+PROBE: SIGNIFICANT CHANGE UP
SARS-COV-2 RNA SPEC QL NAA+PROBE: SIGNIFICANT CHANGE UP
SODIUM SERPL-SCNC: 141 MMOL/L — SIGNIFICANT CHANGE UP (ref 135–145)
TRIGL SERPL-MCNC: 112 MG/DL — SIGNIFICANT CHANGE UP
WBC # BLD: 9.29 K/UL — SIGNIFICANT CHANGE UP (ref 3.8–10.5)
WBC # FLD AUTO: 9.29 K/UL — SIGNIFICANT CHANGE UP (ref 3.8–10.5)

## 2023-03-11 PROCEDURE — 93010 ELECTROCARDIOGRAM REPORT: CPT

## 2023-03-11 PROCEDURE — 99232 SBSQ HOSP IP/OBS MODERATE 35: CPT

## 2023-03-11 PROCEDURE — 99223 1ST HOSP IP/OBS HIGH 75: CPT | Mod: GC

## 2023-03-11 RX ORDER — BUDESONIDE AND FORMOTEROL FUMARATE DIHYDRATE 160; 4.5 UG/1; UG/1
0 AEROSOL RESPIRATORY (INHALATION)
Qty: 0 | Refills: 3 | DISCHARGE

## 2023-03-11 RX ORDER — ASPIRIN/CALCIUM CARB/MAGNESIUM 324 MG
81 TABLET ORAL DAILY
Refills: 0 | Status: DISCONTINUED | OUTPATIENT
Start: 2023-03-11 | End: 2023-03-13

## 2023-03-11 RX ORDER — FLUTICASONE PROPIONATE 50 MCG
1 SPRAY, SUSPENSION NASAL
Refills: 0 | Status: DISCONTINUED | OUTPATIENT
Start: 2023-03-11 | End: 2023-03-13

## 2023-03-11 RX ORDER — HEPARIN SODIUM 5000 [USP'U]/ML
INJECTION INTRAVENOUS; SUBCUTANEOUS
Qty: 25000 | Refills: 0 | Status: DISCONTINUED | OUTPATIENT
Start: 2023-03-11 | End: 2023-03-11

## 2023-03-11 RX ADMIN — Medication 650 MILLIGRAM(S): at 10:15

## 2023-03-11 RX ADMIN — HEPARIN SODIUM 5000 UNIT(S): 5000 INJECTION INTRAVENOUS; SUBCUTANEOUS at 06:17

## 2023-03-11 RX ADMIN — ALBUTEROL 2.5 MILLIGRAM(S): 90 AEROSOL, METERED ORAL at 09:14

## 2023-03-11 RX ADMIN — Medication 650 MILLIGRAM(S): at 01:18

## 2023-03-11 RX ADMIN — Medication 650 MILLIGRAM(S): at 02:00

## 2023-03-11 RX ADMIN — HEPARIN SODIUM 1250 UNIT(S)/HR: 5000 INJECTION INTRAVENOUS; SUBCUTANEOUS at 06:10

## 2023-03-11 RX ADMIN — Medication 1 SPRAY(S): at 21:51

## 2023-03-11 RX ADMIN — Medication 650 MILLIGRAM(S): at 11:15

## 2023-03-11 RX ADMIN — ALBUTEROL 2.5 MILLIGRAM(S): 90 AEROSOL, METERED ORAL at 15:31

## 2023-03-11 RX ADMIN — BUDESONIDE AND FORMOTEROL FUMARATE DIHYDRATE 2 PUFF(S): 160; 4.5 AEROSOL RESPIRATORY (INHALATION) at 21:51

## 2023-03-11 RX ADMIN — Medication 81 MILLIGRAM(S): at 11:21

## 2023-03-11 RX ADMIN — BUDESONIDE AND FORMOTEROL FUMARATE DIHYDRATE 2 PUFF(S): 160; 4.5 AEROSOL RESPIRATORY (INHALATION) at 10:16

## 2023-03-11 RX ADMIN — HEPARIN SODIUM 1250 UNIT(S)/HR: 5000 INJECTION INTRAVENOUS; SUBCUTANEOUS at 07:31

## 2023-03-11 NOTE — H&P ADULT - HISTORY OF PRESENT ILLNESS
This is a 43 y/o M with no pmhx presented to the ED for shortness of breath. 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 He reported that he had covid about 3 years ago and had developed chronic shortness of breath with exertion but was minimal and was manageable with albuterol and symbicort. Recently in the last week the patient felt acutely SOB, can only take minimal steps before feeling SOB. Albuterol was not working. Also has some chest tightness, but none at bedside. He did admit that he stopped symbicort but he put himself back on it a week ago and it was still not improving. The patient also complains of "feeling hot" and also has a frontal headache during the last week, he initially thought that it was a viral infection. Denies other URI symptoms such as runny nose or cough. No family hx of cardiac disease.

## 2023-03-11 NOTE — H&P ADULT - ASSESSMENT
43 y/o M with no pmhx presented to the ED for shortness of breath. PE ruled out, however has positive troponins, concerning for early CAD vs. ACS. Admit for ACS work up.

## 2023-03-11 NOTE — CONSULT NOTE ADULT - SUBJECTIVE AND OBJECTIVE BOX
HPI:    42M HTN HLD presenting with shortness of breath.   Patient sees Dr. Bridges outpatient. had bout of covid in 2020 and ever since then will have flares of shortness of breath, tachcyardia and joint pains. His shortness of breath has becoming debilitating. Was started on Symbicort to help with some of these symptoms. He has seen rheumatologist in past for some of these symptoms, work up overall negative but with nonspecfiic granulaomatous changes in gums.     On wednesday patient woke up with shortness of breath, tachycardia, low grade temperatures and overall discomfort. These symptoms progressed until Friday when he decided to go to ED.     ED course notable for elevated Troponins to 150. TTE wnl. No WBC. Electrolytes wnl. CTA Chest notable for no PE, Trace effusion, some mild GGOs and atelectasiss. He has has persistent headaches, shortness of breath, tachycardia since admissin.         PAST MEDICAL & SURGICAL HISTORY:  No pertinent past medical history      No significant past surgical history          FAMILY HISTORY:  No pertinent family history in first degree relatives        SOCIAL HISTORY:  Smoking: none   EtOH Use:  Marital Status:  Occupation:  Exposures:  Recent Travel:    Allergies    No Known Allergies    Intolerances        HOME MEDICATIONS:    REVIEW OF SYSTEMS:  Constitutional: No fevers or chills. No weight loss. No fatigue or generalised malaise.  Eyes: No itching or discharge from the eyes  ENT: No ear pain. No ear discharge. + nasal congestion. No post nasal drip. No epistaxis. No throat pain. No sore throat. No difficulty swallowing.   CV: No chest pain. No palpitations. No lightheadedness or dizziness.   Resp: + dyspnea at rest. + dyspnea on exertion. No orthopnea. No wheezing. No cough. No stridor. + sputum production. No chest pain with respiration.  GI: No nausea. No vomiting. No diarrhea.  MSK: No joint pain or pain in any extremities  Integumentary: No skin lesions. No pedal edema.  Neurological: No gross motor weakness. No sensory changes.    [ ] All other systems negative  [ ] Unable to assess ROS because ________    OBJECTIVE:  ICU Vital Signs Last 24 Hrs  T(C): 36.9 (11 Mar 2023 11:53), Max: 37.2 (10 Mar 2023 19:03)  T(F): 98.5 (11 Mar 2023 11:53), Max: 98.9 (10 Mar 2023 19:03)  HR: 96 (11 Mar 2023 15:35) (96 - 106)  BP: 119/84 (11 Mar 2023 11:53) (119/84 - 138/89)  BP(mean): --  ABP: --  ABP(mean): --  RR: 18 (11 Mar 2023 11:53) (18 - 20)  SpO2: 96% (11 Mar 2023 11:53) (96% - 99%)    O2 Parameters below as of 11 Mar 2023 11:53  Patient On (Oxygen Delivery Method): room air              CAPILLARY BLOOD GLUCOSE          PHYSICAL EXAM:  General: Awake, alert, oriented X 3.   HEENT: Atraumatic, normocephalic.                  No tonsillar or pharyngeal exudates.  Lymph Nodes: No palpable lymphadenopathy  Neck: No JVD. No carotid bruit.   Respiratory: rales at the bases, no wheezing.   Cardiovascular: S1 S2 normal. No murmurs, rubs or gallops.   Abdomen: Soft, non-tender, non-distended. No organomegaly.  Extremities: Warm to touch. Peripheral pulse palpable. No pedal edema.   Skin: No rashes or skin lesions      HOSPITAL MEDICATIONS:  MEDICATIONS  (STANDING):  aspirin enteric coated 81 milliGRAM(s) Oral daily  budesonide  80 MICROgram(s)/formoterol 4.5 MICROgram(s) Inhaler 2 Puff(s) Inhalation two times a day  coronavirus bivalent (EUA) Booster Vaccine (PFIZER) 0.3 milliLiter(s) IntraMuscular once    MEDICATIONS  (PRN):  acetaminophen     Tablet .. 650 milliGRAM(s) Oral every 6 hours PRN Temp greater or equal to 38C (100.4F), Mild Pain (1 - 3)  albuterol    0.083% 2.5 milliGRAM(s) Nebulizer every 6 hours PRN Shortness of Breath and/or Wheezing  melatonin 3 milliGRAM(s) Oral at bedtime PRN Insomnia      LABS:                        14.1   9.29  )-----------( 263      ( 11 Mar 2023 01:08 )             42.2     03-11    141  |  108<H>  |  14  ----------------------------<  116<H>  3.9   |  22  |  0.84    Ca    9.6      11 Mar 2023 07:59    TPro  7.4  /  Alb  4.0  /  TBili  0.3  /  DBili  x   /  AST  28  /  ALT  29  /  AlkPhos  79  03-11    PT/INR - ( 10 Mar 2023 18:30 )   PT: 13.5 sec;   INR: 1.16 ratio         PTT - ( 11 Mar 2023 07:59 )  PTT:129.4 sec      < from: CT Angio Chest PE Protocol w/ IV Cont (03.10.23 @ 16:44) >  FINDINGS:  CTA: The study is technically adequate with a good contrast bolus to the   pulmonary arteries. No pulmonary embolism. The great vessels are normal   in size. The heart is normal in size.No pericardial effusion.    Lungs/Airways/Pleura: The central airways are patent. Trace pleural   effusions. There are dependent patchy and bandlike opacities in the lower   lobes with homogeneous enhancement suggesting atelectasis.    Mediastinum/Lymph nodes: No thoracic adenopathy.    Upper Abdomen: Unremarkable.    Bones and Soft Tissues: No aggressive osseous lesions.    IMPRESSION:  No pulmonary embolism.    Trace pleural effusions with partial lower lobe atelectasis and/or   scarring.    < end of copied text >      MICROBIOLOGY:     RADIOLOGY:  [ ] Reviewed and interpreted by me    Point of Care Ultrasound Findings;    PFT:    EKG: HPI:    42M HTN HLD presenting with shortness of breath.   Patient sees Dr. Bridges outpatient. had bout of covid in 2020 and ever since then will have flares of shortness of breath, tachcyardia and joint pains. His shortness of breath has becoming debilitating. Was started on Symbicort to help with some of these symptoms. He has seen rheumatologist in past for some of these symptoms, work up overall negative but with nonspecfiic granulaomatous changes in gums.     On wednesday patient woke up with shortness of breath, tachycardia, low grade temperatures and overall discomfort. These symptoms progressed until Friday when he decided to go to ED.     ED course notable for elevated Troponins to 150. TTE wnl. No WBC. Electrolytes wnl. CTA Chest notable for no PE, Trace effusion, some mild GGOs and atelectasiss. He has has persistent headaches, shortness of breath, tachycardia since admissin.         PAST MEDICAL & SURGICAL HISTORY:  No pertinent past medical history      No significant past surgical history          FAMILY HISTORY:  No pertinent family history in first degree relatives        SOCIAL HISTORY:  Smoking: none   EtOH Use:  Marital Status:  Occupation:  Exposures:  Recent Travel:    Allergies    No Known Allergies    Intolerances        HOME MEDICATIONS:    REVIEW OF SYSTEMS:  Constitutional: No fevers or chills. No weight loss. No fatigue or generalised malaise.  Eyes: No itching or discharge from the eyes  ENT: No ear pain. No ear discharge. + nasal congestion. No post nasal drip. No epistaxis. No throat pain. No sore throat. No difficulty swallowing.   CV: No chest pain. No palpitations. No lightheadedness or dizziness.   Resp: + dyspnea at rest. + dyspnea on exertion. No orthopnea. No wheezing. No cough. No stridor. + sputum production. No chest pain with respiration.  GI: No nausea. No vomiting. No diarrhea.  MSK: No joint pain or pain in any extremities  Integumentary: No skin lesions. No pedal edema.  Neurological: No gross motor weakness. No sensory changes.    [x] All other systems negative  [ ] Unable to assess ROS because ________    OBJECTIVE:  ICU Vital Signs Last 24 Hrs  T(C): 36.9 (11 Mar 2023 11:53), Max: 37.2 (10 Mar 2023 19:03)  T(F): 98.5 (11 Mar 2023 11:53), Max: 98.9 (10 Mar 2023 19:03)  HR: 96 (11 Mar 2023 15:35) (96 - 106)  BP: 119/84 (11 Mar 2023 11:53) (119/84 - 138/89)  BP(mean): --  ABP: --  ABP(mean): --  RR: 18 (11 Mar 2023 11:53) (18 - 20)  SpO2: 96% (11 Mar 2023 11:53) (96% - 99%)    O2 Parameters below as of 11 Mar 2023 11:53  Patient On (Oxygen Delivery Method): room air              CAPILLARY BLOOD GLUCOSE          PHYSICAL EXAM:  General: Awake, alert, oriented X 3.   HEENT: Atraumatic, normocephalic.                  No tonsillar or pharyngeal exudates.  Lymph Nodes: No palpable lymphadenopathy  Neck: No JVD. No carotid bruit.   Respiratory: rales at the bases, no wheezing.   Cardiovascular: S1 S2 normal. No murmurs, rubs or gallops.   Abdomen: Soft, non-tender, non-distended. No organomegaly.  Extremities: Warm to touch. Peripheral pulse palpable. No pedal edema.   Skin: No rashes or skin lesions      HOSPITAL MEDICATIONS:  MEDICATIONS  (STANDING):  aspirin enteric coated 81 milliGRAM(s) Oral daily  budesonide  80 MICROgram(s)/formoterol 4.5 MICROgram(s) Inhaler 2 Puff(s) Inhalation two times a day  coronavirus bivalent (EUA) Booster Vaccine (PFIZER) 0.3 milliLiter(s) IntraMuscular once    MEDICATIONS  (PRN):  acetaminophen     Tablet .. 650 milliGRAM(s) Oral every 6 hours PRN Temp greater or equal to 38C (100.4F), Mild Pain (1 - 3)  albuterol    0.083% 2.5 milliGRAM(s) Nebulizer every 6 hours PRN Shortness of Breath and/or Wheezing  melatonin 3 milliGRAM(s) Oral at bedtime PRN Insomnia      LABS:                        14.1   9.29  )-----------( 263      ( 11 Mar 2023 01:08 )             42.2     03-11    141  |  108<H>  |  14  ----------------------------<  116<H>  3.9   |  22  |  0.84    Ca    9.6      11 Mar 2023 07:59    TPro  7.4  /  Alb  4.0  /  TBili  0.3  /  DBili  x   /  AST  28  /  ALT  29  /  AlkPhos  79  03-11    PT/INR - ( 10 Mar 2023 18:30 )   PT: 13.5 sec;   INR: 1.16 ratio         PTT - ( 11 Mar 2023 07:59 )  PTT:129.4 sec      < from: CT Angio Chest PE Protocol w/ IV Cont (03.10.23 @ 16:44) >  FINDINGS:  CTA: The study is technically adequate with a good contrast bolus to the   pulmonary arteries. No pulmonary embolism. The great vessels are normal   in size. The heart is normal in size.No pericardial effusion.    Lungs/Airways/Pleura: The central airways are patent. Trace pleural   effusions. There are dependent patchy and bandlike opacities in the lower   lobes with homogeneous enhancement suggesting atelectasis.    Mediastinum/Lymph nodes: No thoracic adenopathy.    Upper Abdomen: Unremarkable.    Bones and Soft Tissues: No aggressive osseous lesions.    IMPRESSION:  No pulmonary embolism.    Trace pleural effusions with partial lower lobe atelectasis and/or   scarring.    < end of copied text >      MICROBIOLOGY:     RADIOLOGY:  [ x] Reviewed and interpreted by me    Point of Care Ultrasound Findings;    PFT:    EKG:

## 2023-03-11 NOTE — H&P ADULT - NSHPPHYSICALEXAM_GEN_ALL_CORE
PHYSICAL EXAM:  VITALS: Vital Signs Last 24 Hrs  T(C): 37 (10 Mar 2023 20:43), Max: 37.2 (10 Mar 2023 19:03)  T(F): 98.6 (10 Mar 2023 20:43), Max: 98.9 (10 Mar 2023 19:03)  HR: 103 (10 Mar 2023 20:43) (101 - 115)  BP: 138/89 (10 Mar 2023 20:43) (123/88 - 148/92)  BP(mean): --  RR: 18 (10 Mar 2023 20:43) (16 - 20)  SpO2: 96% (10 Mar 2023 20:43) (96% - 100%)    Parameters below as of 10 Mar 2023 20:43  Patient On (Oxygen Delivery Method): room air      GENERAL: NAD, well-developed, well-nourished  HEAD:  Atraumatic, Normocephalic  EYES: EOMI, PERRL, conjunctiva and sclera clear  ENT: Moist Mucus Membranes present, no ulcers appreciated  NECK: Supple, No JVD  CHEST/LUNG: mild bibasilar crackles b/l; No wheezes, rales or rhonchi, no accessory muscle use  HEART: Regular rate and rhythm; No murmurs, rubs, or gallops, (+)S1, S2  ABDOMEN: Soft, Nontender, Nondistended; Normal Bowel sounds   EXTREMITIES:  2+ Peripheral Pulses, No clubbing, cyanosis, or edema  PSYCH: normal mood and affect  NEUROLOGY: AAOx3, non-focal  SKIN: No rashes or lesions

## 2023-03-11 NOTE — H&P ADULT - PROBLEM SELECTOR PLAN 1
Assessment:  - patient presented for new onset chest tightness and shortness of breath  - CTA neg for PE, neg for lung pathology - No pulmonary embolism. Trace pleural effusions with partial lower lobe atelectasis and/or scarring.  - patient has elevated troponins - 154 -> 152 -> 129, + CKMB 12.4  - EKG does not show NSR, no ST changes     Plan:  - cardiology consult - I reconsulted overnight and spoke with NP jo, recommends c/w heparin drip and aspirin 81mg for now, given the troponins are improving, does not recommend loading with plavix for now  - Cardiology to evaluate for need of stress test vs. cath  - monitor overnight for chest pain -  trend troponins  - echo pending  - tele monitoring

## 2023-03-11 NOTE — H&P ADULT - PROBLEM SELECTOR PLAN 2
- CTA neg for lung path or PE  - will investigate cardiac causes as above  - c/w symbicort and albuterol

## 2023-03-11 NOTE — H&P ADULT - NSHPREVIEWOFSYSTEMS_GEN_ALL_CORE
REVIEW OF SYSTEMS:    CONSTITUTIONAL: No weakness, fevers or chills  EYES/ENT: No visual changes;  No dysphagia; No sore throat; No rhinorrhea; No sinus pain/pressure  NECK: No pain or stiffness  RESPIRATORY: No cough, wheezing, hemoptysis; + shortness of breath with exertion  CARDIOVASCULAR: No chest pain or palpitations; No lower extremity edema  GASTROINTESTINAL: No abdominal or epigastric pain. No nausea, vomiting, or hematemesis; No diarrhea or constipation. No melena or hematochezia.  GENITOURINARY: No dysuria, frequency or hematuria  NEUROLOGICAL: No numbness or weakness  MSK: ambulates without assistance  SKIN: No itching, burning, rashes, or lesions   All other review of systems is negative unless indicated above.

## 2023-03-11 NOTE — CONSULT NOTE ADULT - ASSESSMENT
42M pmhx COVID (2020), sinusitis, HTN, HLD presenting with shortness of breath, tachycardia, headache, joint pain. Troponins elevated to 154.     CTA negative for PE, notable for trace effusion, atelectasis  TTE wnl.     DDx post viral illness iso obstructive lung disease     Recommendations:  - Would check full RVP panel.   - Can start flonase for sinus congestion.   - Check procalcitonin.  - Stress test planned for tomorrow.   - On outpatient would follow up with Dr. Bridges. Recommend repeat PFTs and consideration of CPET, PSG study.

## 2023-03-11 NOTE — CONSULT NOTE ADULT - ATTENDING COMMENTS
42M with h/o of COVID, chronic sinusitis, with acute shortness of breath and dypsnea on exertion. 42M with h/o of COVID, chronic sinusitis, with acute shortness of breath and dyspnea on exertion.  Patient is tachycardic with resting saturation in 100s, and elevated troponin.   CT does not show PE but does show bibasilar scarring.  This is unlikely to be the etiology of acute worsening of dyspnea.  Recommend checking full RVP. Check procalcitonin.  Agree with completing cardiac work up.  Need outpatient PFTs and possible CPET.

## 2023-03-11 NOTE — H&P ADULT - NSHPLABSRESULTS_GEN_ALL_CORE
14.1   9.29  )-----------( 263      ( 11 Mar 2023 01:08 )             42.2       03-10    138  |  103  |  8   ----------------------------<  97  3.6   |  23  |  0.85    Ca    9.6      10 Mar 2023 11:55    TPro  7.8  /  Alb  4.3  /  TBili  0.5  /  DBili  x   /  AST  25  /  ALT  32  /  AlkPhos  77  03-10      CARDIAC MARKERS ( 10 Mar 2023 22:45 )  x     / x     / 129 U/L / x     / 7.9 ng/mL  CARDIAC MARKERS ( 10 Mar 2023 18:30 )  x     / x     / 162 U/L / x     / 12.4 ng/mL        PT/INR - ( 10 Mar 2023 18:30 )   PT: 13.5 sec;   INR: 1.16 ratio         PTT - ( 11 Mar 2023 01:08 )  PTT:31.8 sec                CXR: As per my read - no opacities noted, Will wait for prelim/official read    EKG: As per my read - sinus tachycardia

## 2023-03-12 LAB
CRP SERPL-MCNC: 29.4 MG/L — HIGH
ERYTHROCYTE [SEDIMENTATION RATE] IN BLOOD: 40 MM/HR — HIGH (ref 1–15)
HCT VFR BLD CALC: 46.3 % — SIGNIFICANT CHANGE UP (ref 39–50)
HGB BLD-MCNC: 15.4 G/DL — SIGNIFICANT CHANGE UP (ref 13–17)
MCHC RBC-ENTMCNC: 30.5 PG — SIGNIFICANT CHANGE UP (ref 27–34)
MCHC RBC-ENTMCNC: 33.3 GM/DL — SIGNIFICANT CHANGE UP (ref 32–36)
MCV RBC AUTO: 91.7 FL — SIGNIFICANT CHANGE UP (ref 80–100)
NRBC # BLD: 0 /100 WBCS — SIGNIFICANT CHANGE UP (ref 0–0)
NRBC # FLD: 0 K/UL — SIGNIFICANT CHANGE UP (ref 0–0)
PLATELET # BLD AUTO: 288 K/UL — SIGNIFICANT CHANGE UP (ref 150–400)
RBC # BLD: 5.05 M/UL — SIGNIFICANT CHANGE UP (ref 4.2–5.8)
RBC # FLD: 12.9 % — SIGNIFICANT CHANGE UP (ref 10.3–14.5)
TSH SERPL-MCNC: 2.91 UIU/ML — SIGNIFICANT CHANGE UP (ref 0.27–4.2)
WBC # BLD: 6.52 K/UL — SIGNIFICANT CHANGE UP (ref 3.8–10.5)
WBC # FLD AUTO: 6.52 K/UL — SIGNIFICANT CHANGE UP (ref 3.8–10.5)

## 2023-03-12 PROCEDURE — 99232 SBSQ HOSP IP/OBS MODERATE 35: CPT

## 2023-03-12 PROCEDURE — 78452 HT MUSCLE IMAGE SPECT MULT: CPT | Mod: 26

## 2023-03-12 PROCEDURE — 93018 CV STRESS TEST I&R ONLY: CPT | Mod: GC

## 2023-03-12 PROCEDURE — 93016 CV STRESS TEST SUPVJ ONLY: CPT | Mod: GC

## 2023-03-12 RX ADMIN — Medication 1 SPRAY(S): at 18:13

## 2023-03-12 RX ADMIN — BUDESONIDE AND FORMOTEROL FUMARATE DIHYDRATE 2 PUFF(S): 160; 4.5 AEROSOL RESPIRATORY (INHALATION) at 21:47

## 2023-03-12 RX ADMIN — Medication 81 MILLIGRAM(S): at 18:24

## 2023-03-12 RX ADMIN — BUDESONIDE AND FORMOTEROL FUMARATE DIHYDRATE 2 PUFF(S): 160; 4.5 AEROSOL RESPIRATORY (INHALATION) at 08:15

## 2023-03-12 RX ADMIN — ALBUTEROL 2.5 MILLIGRAM(S): 90 AEROSOL, METERED ORAL at 08:33

## 2023-03-12 RX ADMIN — Medication 1 SPRAY(S): at 06:36

## 2023-03-12 NOTE — PROGRESS NOTE ADULT - PROBLEM SELECTOR PLAN 1
- patient presented for new onset chest tightness and shortness of breath, unclear etiology   - CTA neg for PE, trace pleural effusions with partial lower lobe atelectasis and/or scarring  - patient has elevated troponins - 154 -> 152 -> 129  - EKG: NSR, no ST changes   - echo w/ normal LV systolic and diastolic function, normal RV  - tele monitoring  - Cardiology following, low suspicion for ACS   - Obtain Nuclear stress test
- patient presented for new onset chest tightness and shortness of breath, elevated troponins. Possible myopericarditis   - CTA neg for PE, trace pleural effusions with partial lower lobe atelectasis and/or scarring  - patient has elevated troponins - 154 -> 152 -> 129  - EKG: NSR, no ST changes   - echo w/ normal LV systolic and diastolic function, normal RV  - tele monitoring  - Cardiology following, low suspicion for ACS   - F/up Nuclear stress test

## 2023-03-12 NOTE — PROGRESS NOTE ADULT - PROBLEM SELECTOR PLAN 2
- CTA neg for PE, trace pleural effusions with partial lower lobe atelectasis and/or scarring  - c/w symbicort and albuterol given reported asthma after COVID infection in 2020   - Pulm eval
- CTA neg for PE, trace pleural effusions with partial lower lobe atelectasis and/or scarring  - c/w symbicort and albuterol given reported asthma after COVID infection in 2020   - monitor O2 sats   - RVP negative   - Outpatient PFT's and possible CPET  - Pulm consult appreciated

## 2023-03-13 ENCOUNTER — TRANSCRIPTION ENCOUNTER (OUTPATIENT)
Age: 43
End: 2023-03-13

## 2023-03-13 VITALS
OXYGEN SATURATION: 100 % | DIASTOLIC BLOOD PRESSURE: 67 MMHG | SYSTOLIC BLOOD PRESSURE: 119 MMHG | TEMPERATURE: 98 F | HEART RATE: 88 BPM | RESPIRATION RATE: 16 BRPM

## 2023-03-13 DIAGNOSIS — I21.4 NON-ST ELEVATION (NSTEMI) MYOCARDIAL INFARCTION: ICD-10-CM

## 2023-03-13 LAB
BLD GP AB SCN SERPL QL: NEGATIVE — SIGNIFICANT CHANGE UP
HCT VFR BLD CALC: 46.6 % — SIGNIFICANT CHANGE UP (ref 39–50)
HCT VFR BLDA CALC: 45 % — SIGNIFICANT CHANGE UP
HGB BLD CALC-MCNC: 15 G/DL — SIGNIFICANT CHANGE UP (ref 12.6–17.4)
HGB BLD CALC-MCNC: 15 G/DL — SIGNIFICANT CHANGE UP (ref 12.6–17.4)
HGB BLD CALC-MCNC: 15.1 G/DL — SIGNIFICANT CHANGE UP (ref 12.6–17.4)
HGB BLD CALC-MCNC: 15.1 G/DL — SIGNIFICANT CHANGE UP (ref 12.6–17.4)
HGB BLD-MCNC: 15 G/DL — SIGNIFICANT CHANGE UP (ref 13–17)
HGB BLDA-MCNC: 15.1 G/DL — SIGNIFICANT CHANGE UP (ref 12.6–17.4)
INR BLD: 1.09 RATIO — SIGNIFICANT CHANGE UP (ref 0.88–1.16)
MCHC RBC-ENTMCNC: 29.1 PG — SIGNIFICANT CHANGE UP (ref 27–34)
MCHC RBC-ENTMCNC: 32.2 GM/DL — SIGNIFICANT CHANGE UP (ref 32–36)
MCV RBC AUTO: 90.5 FL — SIGNIFICANT CHANGE UP (ref 80–100)
NRBC # BLD: 0 /100 WBCS — SIGNIFICANT CHANGE UP (ref 0–0)
NRBC # FLD: 0 K/UL — SIGNIFICANT CHANGE UP (ref 0–0)
PLATELET # BLD AUTO: 297 K/UL — SIGNIFICANT CHANGE UP (ref 150–400)
PROTHROM AB SERPL-ACNC: 12.6 SEC — SIGNIFICANT CHANGE UP (ref 10.5–13.4)
RBC # BLD: 5.15 M/UL — SIGNIFICANT CHANGE UP (ref 4.2–5.8)
RBC # FLD: 12.5 % — SIGNIFICANT CHANGE UP (ref 10.3–14.5)
RH IG SCN BLD-IMP: POSITIVE — SIGNIFICANT CHANGE UP
SAO2 % BLDA: 98 % — SIGNIFICANT CHANGE UP (ref 94–98)
SAO2 % BLDV: 77 % — SIGNIFICANT CHANGE UP (ref 67–88)
SAO2 % BLDV: 78 % — SIGNIFICANT CHANGE UP (ref 67–88)
SAO2 % BLDV: 79 % — SIGNIFICANT CHANGE UP (ref 67–88)
SAO2 % BLDV: 79 % — SIGNIFICANT CHANGE UP (ref 67–88)
WBC # BLD: 7.38 K/UL — SIGNIFICANT CHANGE UP (ref 3.8–10.5)
WBC # FLD AUTO: 7.38 K/UL — SIGNIFICANT CHANGE UP (ref 3.8–10.5)

## 2023-03-13 PROCEDURE — 99232 SBSQ HOSP IP/OBS MODERATE 35: CPT

## 2023-03-13 PROCEDURE — 99152 MOD SED SAME PHYS/QHP 5/>YRS: CPT

## 2023-03-13 PROCEDURE — 93456 R HRT CORONARY ARTERY ANGIO: CPT | Mod: 26

## 2023-03-13 RX ORDER — IPRATROPIUM BROMIDE AND ALBUTEROL SULFATE 2.5; .5 MG/3ML; MG/3ML
0.5-2.5 (3) SOLUTION RESPIRATORY (INHALATION) 4 TIMES DAILY
Qty: 2 | Refills: 5 | Status: ACTIVE | COMMUNITY
Start: 2023-03-13 | End: 1900-01-01

## 2023-03-13 RX ORDER — FLUTICASONE PROPIONATE 50 MCG
1 SPRAY, SUSPENSION NASAL
Qty: 1 | Refills: 0
Start: 2023-03-13 | End: 2023-03-26

## 2023-03-13 RX ORDER — SODIUM CHLORIDE 9 MG/ML
500 INJECTION INTRAMUSCULAR; INTRAVENOUS; SUBCUTANEOUS
Refills: 0 | Status: DISCONTINUED | OUTPATIENT
Start: 2023-03-13 | End: 2023-03-13

## 2023-03-13 RX ADMIN — SODIUM CHLORIDE 75 MILLILITER(S): 9 INJECTION INTRAMUSCULAR; INTRAVENOUS; SUBCUTANEOUS at 11:17

## 2023-03-13 RX ADMIN — Medication 1 SPRAY(S): at 06:11

## 2023-03-13 RX ADMIN — SODIUM CHLORIDE 100 MILLILITER(S): 9 INJECTION INTRAMUSCULAR; INTRAVENOUS; SUBCUTANEOUS at 13:39

## 2023-03-13 NOTE — DISCHARGE NOTE PROVIDER - NSDCCPCAREPLAN_GEN_ALL_CORE_FT
PRINCIPAL DISCHARGE DIAGNOSIS  Diagnosis: GUZMAN (dyspnea on exertion)  Assessment and Plan of Treatment: You were admitted with shortness of breath. Labs showed elevated troponin, but cardiac workup which consisted of echo, stress test, and right and left heart cath were unremarkable.   You were seen by cardiology and pulmonology and recommended for outpatient pulmonary function tests and noninvasive cardiopulmonary exercise testing.      SECONDARY DISCHARGE DIAGNOSES  Diagnosis: NSTEMI (non-ST elevation myocardial infarction)  Assessment and Plan of Treatment: Elevated troponin on admission, cardiac workup unremarkable.     PRINCIPAL DISCHARGE DIAGNOSIS  Diagnosis: GUZMAN (dyspnea on exertion)  Assessment and Plan of Treatment: You were admitted with shortness of breath. Labs showed elevated troponin, but cardiac workup which consisted of echo, stress test, and right and left heart cath were unremarkable.   You were seen by cardiology and pulmonology and recommended for outpatient pulmonary function tests and noninvasive cardiopulmonary exercise testing.      SECONDARY DISCHARGE DIAGNOSES  Diagnosis: Elevated troponin  Assessment and Plan of Treatment: Elevated troponing on admission, cardiac workup unremarkable.

## 2023-03-13 NOTE — DISCHARGE NOTE PROVIDER - PROVIDER TOKENS
PROVIDER:[TOKEN:[[id number]]],FREE:[LAST:[Your],FIRST:[PCP],PHONE:[(   )    -],FAX:[(   )    -],FOLLOWUP:[2 weeks]]

## 2023-03-13 NOTE — PROGRESS NOTE ADULT - ASSESSMENT
41 y/o male no pmh c/o sob and mild chest tightness x3 days. Has had shortness of breath as well as fevers and myalgias. Troponin slightly elevated with no ischemic EKG changes. Echo with normal LV function. CTA ruled out PE. Differential unlikely to include ACS.    Recs:  - stop heparin  - consider non-cardiac workup for chest pain and headaches  - will sign off    Shravan Jalloh MD  Chief Cardiology Fellow PGY-6  [facility]    Notes are not final until signed by attending  For all consults and questions:  [email or web address]   Login: [email or web address]
43 y/o M with no pmhx presented to the ED for shortness of breath. PE ruled out, however has positive troponins, 
43 y/o male no pmh c/o sob and mild chest tightness x3 days. Has had shortness of breath as well as fevers and myalgias. Troponin elevated peaked at 154 with no ischemic EKG changes. Echo with normal LV function. CTA ruled out PE. Planned for R/LHC to further work-up etiology for dyspnea and elevated troponin.    #Dyspnea  Recs:  - as above work-up so far including TTE with normal LV function, CTA negative for PE, NST unremarkable  - unclear etiology for dyspnea, awaiting L/RHC to further work-up planned for today  - continue tele monitoring    Keith Lentz MD  Cardiology Fellow - PGY4    Please check [email or web address] for cardiology service schedule and contact information. 
43 y/o M with no pmhx presented to the ED for shortness of breath. PE ruled out, however has positive troponins,

## 2023-03-13 NOTE — PROGRESS NOTE ADULT - SUBJECTIVE AND OBJECTIVE BOX
Cardiology Progress Note  ------------------------------------------------------------------------------------------  SUBJECTIVE:   - Tele: NSR w/ PVC's  - No events overnight. Denies CP, SOB or Palpitations.   -------------------------------------------------------------------------------------------  ROS:  CV: chest pain (-), palpitation (-), orthopnea (-), PND (-), edema (-)  PULM: SOB (-), cough (-), wheezing (-), hemoptysis (-).   CONST: fever (-), chills (-) or fatigue (-)  GI: abdominal distension (-), abdominal pain (-) , nausea/vomiting (-), hematemesis, (-), melena (-), hematochezia (-)  : dysuria (-), frequency (-), hematuria (-).   NEURO: numbness (-), weakness (-), dizziness (-)  MSK: myalgia (-), joint pain (-)   SKIN: itching (-), rash (-)  HEENT:  visual changes (-); vertigo or throat pain (-);  neck stiffness (-)   Psych: change in mood (-), anxiety (-), depression (-)     All other review of systems is negative unless indicated above.   -------------------------------------------------------------------------------------------  VS:  T(F): 98.4 (), Max: 99 ()  HR: 88 () (88 - 95)  BP: 119/67 () (119/67 - 127/90)  RR: 16 ()  SpO2: 100% ()  I&O's Summary    PHYSICAL EXAM:  GENERAL: NAD  HEAD:  Atraumatic, Normocephalic.  EYES: EOMI, PERRLA, conjunctiva and sclera clear.  ENT: Moist mucous membranes.  NECK: Supple, No JVD.  CHEST/LUNG: Clear to auscultation bilaterally; No rales, rhonchi, wheezing, or rubs. Unlabored respirations.  HEART: Regular rate and rhythm; No murmurs, rubs, or gallops.  ABDOMEN: Bowel sounds present; Soft, Nontender, Nondistended.   EXTREMITIES: No edema. 2+ Peripheral Pulses, brisk capillary refill. No clubbing or cyanosis.   PSYCH: Normal affect.  SKIN: No rashes or lesions.  -------------------------------------------------------------------------------------------  LABS:                          15.0   7.38  )-----------( 297      ( 13 Mar 2023 08:07 )             46.6           PT/INR - ( 13 Mar 2023 08:07 )   PT: 12.6 sec;   INR: 1.09 ratio           CARDIAC MARKERS ( 10 Mar 2023 22:45 )  129 ng/L / x     / x     / 129 U/L / x     / 7.9 ng/mL  CARDIAC MARKERS ( 10 Mar 2023 18:30 )  x     / x     / x     / 162 U/L / x     / 12.4 ng/mL  CARDIAC MARKERS ( 10 Mar 2023 14:30 )  152 ng/L / x     / x     / x     / x     / x      CARDIAC MARKERS ( 10 Mar 2023 11:55 )  154 ng/L / x     / x     / x     / x     / x          Total Cholesterol: 183  LDL: --  HDL: 51  T        -------------------------------------------------------------------------------------------  Meds:  acetaminophen     Tablet .. 650 milliGRAM(s) Oral every 6 hours PRN  albuterol    0.083% 2.5 milliGRAM(s) Nebulizer every 6 hours PRN  aspirin enteric coated 81 milliGRAM(s) Oral daily  budesonide  80 MICROgram(s)/formoterol 4.5 MICROgram(s) Inhaler 2 Puff(s) Inhalation two times a day  coronavirus bivalent (EUA) Booster Vaccine (PFIZER) 0.3 milliLiter(s) IntraMuscular once  fluticasone propionate 50 MICROgram(s)/spray Nasal Spray 1 Spray(s) Both Nostrils two times a day  melatonin 3 milliGRAM(s) Oral at bedtime PRN  sodium chloride 0.9%. 500 milliLiter(s) IV Continuous <Continuous>    -------------------------------------------------------------------------------------------  NST 3/12/23:  IMPRESSIONS:Normal Study  * The left ventricle was normal in size.  * Tracer uptake was homogeneous throughout the left  ventricle.  * Normal study; no evidence for myocardial infarction or  ischemia.  * Gated wall motion analysis was performed, and shows  normal wall motion.    TTE 3/10/23:  Ejection Fraction (Visual Estimate): 55-60 %  ------------------------------------------------------------------------  OBSERVATIONS:  Mitral Valve: Normal mitral valve.  Aortic Root: Normal aortic root.  Aortic Valve: Normal trileaflet aortic valve.  Left Atrium: Normal left atrium.  LA volume index = 18  cc/m2.  Left Ventricle: Endocardial visualization enhanced with  intravenous injection of echo contrast (Definity). Normal  left ventricular systolic function. Normal left ventricular  internal dimensions and wall thicknesses. Normal left  ventricular diastolic function.  Right Heart: Normal right atrium. Normalright ventricular  size and function. Normal tricuspid valve. Normal pulmonic  valve.  Pericardium/PleuraNormal pericardium with no pericardial  effusion.  ------------------------------------------------------------------------  CONCLUSIONS:  1. Endocardial visualization enhanced with intravenous  injection of echo contrast (Definity). Normal left  ventricular systolic function.  2. Normal left ventricular diastolic function.  3. Normal right ventricular size and function.  4. Normal pericardiumwith no pericardial effusion.    -------------------------------------------------------------------------------------------  
[facility] Medicine  Garth JallohDO  Pager (M-F, 8A-5P): [phone]      Patient is a 42y old  Male who presents with a chief complaint of shortness of breath (13 Mar 2023 11:13)      SUBJECTIVE / OVERNIGHT EVENTS: no overnight events   ADDITIONAL REVIEW OF SYSTEMS:    MEDICATIONS  (STANDING):  aspirin enteric coated 81 milliGRAM(s) Oral daily  budesonide  80 MICROgram(s)/formoterol 4.5 MICROgram(s) Inhaler 2 Puff(s) Inhalation two times a day  coronavirus bivalent (EUA) Booster Vaccine (PFIZER) 0.3 milliLiter(s) IntraMuscular once  fluticasone propionate 50 MICROgram(s)/spray Nasal Spray 1 Spray(s) Both Nostrils two times a day  sodium chloride 0.9%. 500 milliLiter(s) (100 mL/Hr) IV Continuous <Continuous>  sodium chloride 0.9%. 500 milliLiter(s) (75 mL/Hr) IV Continuous <Continuous>    MEDICATIONS  (PRN):  acetaminophen     Tablet .. 650 milliGRAM(s) Oral every 6 hours PRN Temp greater or equal to 38C (100.4F), Mild Pain (1 - 3)  albuterol    0.083% 2.5 milliGRAM(s) Nebulizer every 6 hours PRN Shortness of Breath and/or Wheezing  melatonin 3 milliGRAM(s) Oral at bedtime PRN Insomnia      CAPILLARY BLOOD GLUCOSE        I&O's Summary      PHYSICAL EXAM:  Vital Signs Last 24 Hrs  T(C): 36.9 (13 Mar 2023 06:00), Max: 37.2 (12 Mar 2023 19:15)  T(F): 98.4 (13 Mar 2023 06:00), Max: 99 (12 Mar 2023 19:15)  HR: 88 (13 Mar 2023 06:00) (88 - 95)  BP: 119/67 (13 Mar 2023 06:00) (119/67 - 127/90)  BP(mean): --  RR: 16 (13 Mar 2023 06:00) (16 - 18)  SpO2: 100% (13 Mar 2023 06:00) (100% - 100%)    Parameters below as of 13 Mar 2023 06:00  Patient On (Oxygen Delivery Method): room air      CONSTITUTIONAL: NAD, well-developed, well-groomed  EYES: EOMI; conjunctiva and sclera clear  ENMT: Moist oral mucosa, no pharyngeal injection or exudates; normal dentition  NECK: Supple, no palpable masses; no thyromegaly  RESPIRATORY: Normal respiratory effort; lungs are clear to auscultation bilaterally  CARDIOVASCULAR: Regular rate and rhythm, normal S1 and S2, no murmur/rub/gallop; No lower extremity edema; Peripheral pulses are 2+ bilaterally  ABDOMEN: Nontender to palpation, normoactive bowel sounds, no rebound/guarding; No hepatosplenomegaly  MUSKULOSKELETAL:  no clubbing or cyanosis of digits; no joint swelling or tenderness to palpation  PSYCH: A+O to person, place, and time; affect appropriate  NEUROLOGY: CN 2-12 are intact and symmetric; no gross sensory deficits;   SKIN: No rashes; no palpable lesions    LABS:                        15.0   7.38  )-----------( 297      ( 13 Mar 2023 08:07 )             46.6           PT/INR - ( 13 Mar 2023 08:07 )   PT: 12.6 sec;   INR: 1.09 ratio                   Culture - Blood (collected 10 Mar 2023 18:35)  Source: .Blood Blood-Venous  Preliminary Report (11 Mar 2023 23:02):    No growth to date.    Culture - Blood (collected 10 Mar 2023 18:25)  Source: .Blood Blood-Peripheral  Preliminary Report (11 Mar 2023 23:02):    No growth to date.        RADIOLOGY & ADDITIONAL TESTS:  Results Reviewed:   Imaging Personally Reviewed:  Electrocardiogram Personally Reviewed:    COORDINATION OF CARE:  Care Discussed with Consultants/Other Providers [Y/N]: Y  Prior or Outpatient Records Reviewed [Y/N]: Y  
PROGRESS NOTE:     Patient is a 42y old  Male who presents with a chief complaint of shortness of breath (11 Mar 2023 10:28)      SUBJECTIVE / OVERNIGHT EVENTS: Still c/o shortness of breath and tachycardia.     ADDITIONAL REVIEW OF SYSTEMS:    MEDICATIONS  (STANDING):  aspirin enteric coated 81 milliGRAM(s) Oral daily  budesonide  80 MICROgram(s)/formoterol 4.5 MICROgram(s) Inhaler 2 Puff(s) Inhalation two times a day  coronavirus bivalent (EUA) Booster Vaccine (PFIZER) 0.3 milliLiter(s) IntraMuscular once    MEDICATIONS  (PRN):  acetaminophen     Tablet .. 650 milliGRAM(s) Oral every 6 hours PRN Temp greater or equal to 38C (100.4F), Mild Pain (1 - 3)  albuterol    0.083% 2.5 milliGRAM(s) Nebulizer every 6 hours PRN Shortness of Breath and/or Wheezing  melatonin 3 milliGRAM(s) Oral at bedtime PRN Insomnia      CAPILLARY BLOOD GLUCOSE        I&O's Summary      PHYSICAL EXAM:  Vital Signs Last 24 Hrs  T(C): 36.9 (11 Mar 2023 11:53), Max: 37.2 (10 Mar 2023 19:03)  T(F): 98.5 (11 Mar 2023 11:53), Max: 98.9 (10 Mar 2023 19:03)  HR: 98 (11 Mar 2023 11:53) (98 - 110)  BP: 119/84 (11 Mar 2023 11:53) (119/84 - 146/87)  BP(mean): --  RR: 18 (11 Mar 2023 11:53) (18 - 20)  SpO2: 96% (11 Mar 2023 11:53) (96% - 99%)    Parameters below as of 11 Mar 2023 11:53  Patient On (Oxygen Delivery Method): room air        CONSTITUTIONAL: NAD, well-developed  RESPIRATORY: Normal respiratory effort; R basilar crackles   CARDIOVASCULAR: Tachycardia, normal S1 and S2, no murmur/rub/gallop; No lower extremity edema; Peripheral pulses are 2+ bilaterally  ABDOMEN: Nontender to palpation, normoactive bowel sounds, no rebound/guarding; No hepatosplenomegaly  MUSCLOSKELETAL: no clubbing or cyanosis of digits; no joint swelling or tenderness to palpation  PSYCH: A+O to person, place, and time; affect appropriate    LABS:                        14.1   9.29  )-----------( 263      ( 11 Mar 2023 01:08 )             42.2     03-11    141  |  108<H>  |  14  ----------------------------<  116<H>  3.9   |  22  |  0.84    Ca    9.6      11 Mar 2023 07:59    TPro  7.4  /  Alb  4.0  /  TBili  0.3  /  DBili  x   /  AST  28  /  ALT  29  /  AlkPhos  79  03-11    PT/INR - ( 10 Mar 2023 18:30 )   PT: 13.5 sec;   INR: 1.16 ratio         PTT - ( 11 Mar 2023 07:59 )  PTT:129.4 sec  CARDIAC MARKERS ( 10 Mar 2023 22:45 )  x     / x     / 129 U/L / x     / 7.9 ng/mL  CARDIAC MARKERS ( 10 Mar 2023 18:30 )  x     / x     / 162 U/L / x     / 12.4 ng/mL            RADIOLOGY & ADDITIONAL TESTS:  Results Reviewed:   Imaging Personally Reviewed:  Electrocardiogram Personally Reviewed:    COORDINATION OF CARE:  Care Discussed with Consultants/Other Providers [Y/N]:  Prior or Outpatient Records Reviewed [Y/N]:  
Patient seen and examined at bedside.    Overnight Events: no events; complaining of headaches    Review of Systems:  REVIEW OF SYSTEMS:  CONSTITUTIONAL: No weakness, fevers or chills  EYES/ENT: No visual changes;  No dysphagia  NECK: No pain or stiffness  RESPIRATORY: No cough, wheezing, hemoptysis; No shortness of breath  CARDIOVASCULAR: No chest pain or palpitations; No lower extremity edema  GASTROINTESTINAL: No abdominal or epigastric pain. No nausea, vomiting, or hematemesis; No diarrhea or constipation. No melena or hematochezia.  BACK: No back pain  GENITOURINARY: No dysuria, frequency or hematuria  SKIN: No itching, burning, rashes, or lesions   All other review of systems is negative unless indicated above.    [ ] All other systems negative  [ ] Unable to assess ROS due to    Current Meds:  acetaminophen     Tablet .. 650 milliGRAM(s) Oral every 6 hours PRN  albuterol    0.083% 2.5 milliGRAM(s) Nebulizer every 6 hours PRN  aspirin enteric coated 81 milliGRAM(s) Oral daily  budesonide  80 MICROgram(s)/formoterol 4.5 MICROgram(s) Inhaler 2 Puff(s) Inhalation two times a day  coronavirus bivalent (EUA) Booster Vaccine (PFIZER) 0.3 milliLiter(s) IntraMuscular once  heparin   Injectable 5000 Unit(s) IV Push every 6 hours PRN  heparin  Infusion.  Unit(s)/Hr IV Continuous <Continuous>  melatonin 3 milliGRAM(s) Oral at bedtime PRN      PAST MEDICAL & SURGICAL HISTORY:  No pertinent past medical history      No significant past surgical history          Vitals:  T(F): 98.6 (03-10), Max: 98.9 (-10)  HR: 103 (-10) (101 - 115)  BP: 138/89 (-10) (123/88 - 148/92)  RR: 18 (03-10)  SpO2: 96% (03-10)  I&O's Summary      Physical Exam:  Appearance: No acute distress; well appearing  Eyes: PERRL, EOMI, pink conjunctiva  HENT: Normal oral mucosa  Cardiovascular: RRR, S1, S2, no murmurs, rubs, or gallops; no edema; no JVD  Respiratory: Clear to auscultation bilaterally  Gastrointestinal: soft, non-tender, non-distended with normal bowel sounds  Musculoskeletal: No clubbing; no joint deformity   Neurologic: Non-focal  Lymphatic: No lymphadenopathy  Psychiatry: AAOx3, mood & affect appropriate  Skin: No rashes, ecchymoses, or cyanosis                          14.1   9.29  )-----------( 263      ( 11 Mar 2023 01:08 )             42.2     03-11    141  |  108<H>  |  14  ----------------------------<  116<H>  3.9   |  22  |  0.84    Ca    9.6      11 Mar 2023 07:59    TPro  7.4  /  Alb  4.0  /  TBili  0.3  /  DBili  x   /  AST  28  /  ALT  29  /  AlkPhos  79  03-11    PT/INR - ( 10 Mar 2023 18:30 )   PT: 13.5 sec;   INR: 1.16 ratio         PTT - ( 11 Mar 2023 07:59 )  PTT:129.4 sec  CARDIAC MARKERS ( 10 Mar 2023 22:45 )  x     / x     / 129 U/L / x     / 7.9 ng/mL  CARDIAC MARKERS ( 10 Mar 2023 18:30 )  x     / x     / 162 U/L / x     / 12.4 ng/mL        Total Cholesterol: 183  LDL: --  HDL: 51  T          
PROGRESS NOTE:     Patient is a 42y old  Male who presents with a chief complaint of shortness of breath (11 Mar 2023 16:07)      SUBJECTIVE / OVERNIGHT EVENTS: No new complaints. Still w/ shortness of breath at times.     ADDITIONAL REVIEW OF SYSTEMS:    MEDICATIONS  (STANDING):  aspirin enteric coated 81 milliGRAM(s) Oral daily  budesonide  80 MICROgram(s)/formoterol 4.5 MICROgram(s) Inhaler 2 Puff(s) Inhalation two times a day  coronavirus bivalent (EUA) Booster Vaccine (PFIZER) 0.3 milliLiter(s) IntraMuscular once  fluticasone propionate 50 MICROgram(s)/spray Nasal Spray 1 Spray(s) Both Nostrils two times a day    MEDICATIONS  (PRN):  acetaminophen     Tablet .. 650 milliGRAM(s) Oral every 6 hours PRN Temp greater or equal to 38C (100.4F), Mild Pain (1 - 3)  albuterol    0.083% 2.5 milliGRAM(s) Nebulizer every 6 hours PRN Shortness of Breath and/or Wheezing  melatonin 3 milliGRAM(s) Oral at bedtime PRN Insomnia      CAPILLARY BLOOD GLUCOSE        I&O's Summary      PHYSICAL EXAM:  Vital Signs Last 24 Hrs  T(C): 36.6 (12 Mar 2023 06:35), Max: 37 (11 Mar 2023 22:10)  T(F): 97.9 (12 Mar 2023 06:35), Max: 98.6 (11 Mar 2023 22:10)  HR: 82 (12 Mar 2023 06:35) (82 - 98)  BP: 118/79 (12 Mar 2023 06:35) (118/79 - 125/82)  BP(mean): --  RR: 18 (12 Mar 2023 06:35) (18 - 20)  SpO2: 95% (12 Mar 2023 11:33) (95% - 98%)    Parameters below as of 12 Mar 2023 11:33  Patient On (Oxygen Delivery Method): room air      CONSTITUTIONAL: NAD, well-developed  RESPIRATORY: Normal respiratory effort; R basilar crackles   CARDIOVASCULAR: Tachycardia, normal S1 and S2, no murmur/rub/gallop; No lower extremity edema; Peripheral pulses are 2+ bilaterally  ABDOMEN: Nontender to palpation, normoactive bowel sounds, no rebound/guarding; No hepatosplenomegaly  MUSCLOSKELETAL: no clubbing or cyanosis of digits; no joint swelling or tenderness to palpation  PSYCH: A+O to person, place, and time; affect appropriate    LABS:                        15.4   6.52  )-----------( 288      ( 12 Mar 2023 07:50 )             46.3     03-11    141  |  108<H>  |  14  ----------------------------<  116<H>  3.9   |  22  |  0.84    Ca    9.6      11 Mar 2023 07:59    TPro  7.4  /  Alb  4.0  /  TBili  0.3  /  DBili  x   /  AST  28  /  ALT  29  /  AlkPhos  79  03-11    PT/INR - ( 10 Mar 2023 18:30 )   PT: 13.5 sec;   INR: 1.16 ratio         PTT - ( 11 Mar 2023 07:59 )  PTT:129.4 sec  CARDIAC MARKERS ( 10 Mar 2023 22:45 )  x     / x     / 129 U/L / x     / 7.9 ng/mL  CARDIAC MARKERS ( 10 Mar 2023 18:30 )  x     / x     / 162 U/L / x     / 12.4 ng/mL          Culture - Blood (collected 10 Mar 2023 18:35)  Source: .Blood Blood-Venous  Preliminary Report (11 Mar 2023 23:02):    No growth to date.    Culture - Blood (collected 10 Mar 2023 18:25)  Source: .Blood Blood-Peripheral  Preliminary Report (11 Mar 2023 23:02):    No growth to date.        RADIOLOGY & ADDITIONAL TESTS:  Results Reviewed:   Imaging Personally Reviewed:  Electrocardiogram Personally Reviewed:    COORDINATION OF CARE:  Care Discussed with Consultants/Other Providers [Y/N]:  Prior or Outpatient Records Reviewed [Y/N]:

## 2023-03-13 NOTE — DISCHARGE NOTE PROVIDER - NSDCMRMEDTOKEN_GEN_ALL_CORE_FT
albuterol 2.5 mg/3 mL (0.083%) inhalation solution: 3 milliliter(s) by nebulizer every 6 hours   SYMBICORT 80-4.5 MCG INHALER: INHALE 2 PUFFS TWICE DAILY. RINSE MOUTH AFTER USE.   albuterol 2.5 mg/3 mL (0.083%) inhalation solution: 3 milliliter(s) by nebulizer every 6 hours   fluticasone 50 mcg/inh nasal spray: 1 spray(s) nasal 2 times a day  SYMBICORT 80-4.5 MCG INHALER: INHALE 2 PUFFS TWICE DAILY. RINSE MOUTH AFTER USE.

## 2023-03-13 NOTE — PROGRESS NOTE ADULT - ATTENDING COMMENTS
RHC/LHC today given persistence of symptoms despite normal non-invasive testing.  If no significant findings consider neuro/muscular etiology of pt's symptoms given persistent PASC-19. consider outpatient CPET testing
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
## 2023-03-13 NOTE — DISCHARGE NOTE PROVIDER - CARE PROVIDER_API CALL
Wesley Bridges)  Critical Care Medicine; Internal Medicine; Pulmonary Disease  [address] [place] [id number]  Phone: [phone]  Fax: [phone]  Follow Up Time:     Your, PCP  Phone: (   )    -  Fax: (   )    -  Follow Up Time: 2 weeks

## 2023-03-13 NOTE — CHART NOTE - NSCHARTNOTEFT_GEN_A_CORE
I personally reviewed the patient's stress test.    Although the images are essentially normal, there is some concern for an inferior defect which is likely artifactual. The patient remains highly symptomatic and we have no answer for his significantly elevated troponin.    After a discussion with the patient reviewing the options of no further testing, cMRI, cardiac CT, and cath, we have decided to proceed with cardiac cath (both left and right heart) both for assessment of CAD and for assessment of his pulmonary pressures.     Plan for r/l heart cath tomorrow.
Patient is a 42y old  Male who presents with a chief complaint of shortness of breath (13 Mar 2023 14:21)    status post cath via R radial and brachial. Site clean, dry, intact. Pulses present, capillary refill appropriate.  no signs of hematoma present, surrounding area soft. VSS no c/o pain.      Caitlyn Santos PA-C  Medicine  pager [phone]

## 2023-03-13 NOTE — DISCHARGE NOTE PROVIDER - NSDCCPTREATMENT_GEN_ALL_CORE_FT
PRINCIPAL PROCEDURE  Procedure: Echo 2D  Findings and Treatment: 1. Endocardial visualization enhanced with intravenous  injection of echo contrast (Definity). Normal left  ventricular systolic function.  2. Normal left ventricular diastolic function.  3. Normal right ventricular size and function.  4. Normal pericardiumwith no pericardial effusion.         PRINCIPAL PROCEDURE  Procedure: Echo 2D  Findings and Treatment: 1. Endocardial visualization enhanced with intravenous  injection of echo contrast (Definity). Normal left  ventricular systolic function.  2. Normal left ventricular diastolic function.  3. Normal right ventricular size and function.  4. Normal pericardiumwith no pericardial effusion.        SECONDARY PROCEDURE  Procedure: CT chest w/ contrast  Findings and Treatment: No pulmonary embolism.  Trace pleural effusions with partial lower lobe atelectasis and/or   scarring.

## 2023-03-13 NOTE — DISCHARGE NOTE PROVIDER - HOSPITAL COURSE
43 y/o male no pmh c/o sob and mild chest tightness x3 days. Has had shortness of breath as well as fevers and myalgias. Troponin elevated peaked at 154 with no ischemic EKG changes. Echo with normal LV function. CTA ruled out PE.     ·  Problem: Elevated troponin.   ·  Plan: - patient presented for new onset chest tightness and shortness of breath, elevated troponins. Possible myopericarditis   - CTA neg for PE, trace pleural effusions with partial lower lobe atelectasis and/or scarring  - patient has elevated troponins - 154 -> 152 -> 129  - EKG: NSR, no ST changes   - echo w/ normal LV systolic and diastolic function, normal RV  - tele monitoring  - Cardiology following, low suspicion for ACS   - s/p NST and normal Right and Left heart cath, recommend outpt CPET.     ·  Problem: Shortness of breath.   ·  Plan: - CTA neg for PE, trace pleural effusions with partial lower lobe atelectasis and/or scarring  - c/w symbicort and albuterol given reported asthma after COVID infection in 2020   - monitor O2 sats   - RVP negative   - Outpatient PFT's and possible CPET  - Pulm consult appreciated.

## 2023-03-13 NOTE — DISCHARGE NOTE PROVIDER - NSDCFUSCHEDAPPT_GEN_ALL_CORE_FT
[facility]  Jessica Jo  Scheduled Appointment: 03/21/2023    Wesley Bridges  [facility]  PULMMED [address] [place] [address]  Scheduled Appointment: 04/18/2023

## 2023-03-13 NOTE — DISCHARGE NOTE NURSING/CASE MANAGEMENT/SOCIAL WORK - NSDCPEFALRISK_GEN_ALL_CORE
For information on Fall & Injury Prevention, visit: [email or web address] OR  [email or web address] OR  https://www.cdc.gov/steadi/patient.html

## 2023-03-13 NOTE — DISCHARGE NOTE NURSING/CASE MANAGEMENT/SOCIAL WORK - PATIENT PORTAL LINK FT
You can access the FollowMyHealth Patient Portal offered by  by registering at the following website: [email or web address]. By joining [facility]’s FollowMyHealth portal, you will also be able to view your health information using other applications (apps) compatible with our system.

## 2023-03-13 NOTE — DISCHARGE NOTE PROVIDER - ATTENDING DISCHARGE PHYSICAL EXAMINATION:
CONSTITUTIONAL: NAD, well-developed  RESPIRATORY: Normal respiratory effort; L>R basilar crackles   CARDIOVASCULAR: Tachycardia, normal S1 and S2, no murmur/rub/gallop; No lower extremity edema; Peripheral pulses are 2+ bilaterally  ABDOMEN: Nontender to palpation, normoactive bowel sounds, no rebound/guarding; No hepatosplenomegaly  MUSCLOSKELETAL: no clubbing or cyanosis of digits; no joint swelling or tenderness to palpation  PSYCH: A+O to person, place, and time; affect appropriate

## 2023-03-14 DIAGNOSIS — R05.9 COUGH, UNSPECIFIED: ICD-10-CM

## 2023-03-15 LAB
CULTURE RESULTS: SIGNIFICANT CHANGE UP
CULTURE RESULTS: SIGNIFICANT CHANGE UP
LEGIONELLA AG UR QL: NEGATIVE
SPECIMEN SOURCE: SIGNIFICANT CHANGE UP
SPECIMEN SOURCE: SIGNIFICANT CHANGE UP

## 2023-03-16 LAB
BACTERIA SPT CULT: NORMAL
M PNEUMO IGG SER IA-ACNC: POSITIVE
M PNEUMO IGG SER QL IA: 1.29 INDEX
M PNEUMO IGM SER QL IA: 0.25 INDEX
MYCOPLASMA AG SPEC QL: NEGATIVE

## 2023-03-17 ENCOUNTER — NON-APPOINTMENT (OUTPATIENT)
Age: 43
End: 2023-03-17

## 2023-03-17 PROBLEM — Z78.9 OTHER SPECIFIED HEALTH STATUS: Chronic | Status: ACTIVE | Noted: 2023-03-10

## 2023-03-21 ENCOUNTER — NON-APPOINTMENT (OUTPATIENT)
Age: 43
End: 2023-03-21

## 2023-03-21 ENCOUNTER — APPOINTMENT (OUTPATIENT)
Dept: CARDIOLOGY | Facility: CLINIC | Age: 43
End: 2023-03-21
Payer: COMMERCIAL

## 2023-03-21 VITALS
HEART RATE: 77 BPM | HEIGHT: 69 IN | OXYGEN SATURATION: 96 % | DIASTOLIC BLOOD PRESSURE: 77 MMHG | BODY MASS INDEX: 27.4 KG/M2 | WEIGHT: 185 LBS | SYSTOLIC BLOOD PRESSURE: 121 MMHG

## 2023-03-21 DIAGNOSIS — R06.02 SHORTNESS OF BREATH: ICD-10-CM

## 2023-03-21 DIAGNOSIS — R77.8 OTHER SPECIFIED ABNORMALITIES OF PLASMA PROTEINS: ICD-10-CM

## 2023-03-21 PROCEDURE — 93000 ELECTROCARDIOGRAM COMPLETE: CPT

## 2023-03-21 PROCEDURE — 99214 OFFICE O/P EST MOD 30 MIN: CPT

## 2023-03-21 NOTE — REASON FOR VISIT
[Symptom and Test Evaluation] : symptom and test evaluation [FreeTextEntry1] : Very pleasant 42 year old gentleman, known to me from the hospital, presents for f/u of elevated cardiac biomarkers.\par \par The patient presented with acute onset dyspnea, with a chronic background of long COVID.  In that setting, he had elevated hs Troponin to 150+.  Echo, nuclear stress test, and cardiac cath were normal. CTA negative for PE. No pulmonary HTN on echo or cath. \par \par Ultimately, responded symptomatically to steroids and abx. \par \par Currently back to work, but still not 100%. \par \par 1. There is no clear etiology of his elevated biomarkers. I am concerned for underlying myocarditis. He has already had an echo, cath, nuclear stress (with some inferior defects). We will scheduled a cardiac MRI to evaluate for LGE in the inferior wall suggestive of limited or focal myocarditis.

## 2023-03-21 NOTE — ASSESSMENT
[FreeTextEntry1] : 1. There is no clear etiology of his elevated biomarkers. I am concerned for underlying myocarditis. He has already had an echo, cath, nuclear stress (with some inferior defects). We will scheduled a cardiac MRI to evaluate for LGE in the inferior wall suggestive of limited or focal myocarditis.

## 2023-03-23 ENCOUNTER — APPOINTMENT (OUTPATIENT)
Dept: ALLERGY | Facility: CLINIC | Age: 43
End: 2023-03-23
Payer: COMMERCIAL

## 2023-03-23 ENCOUNTER — NON-APPOINTMENT (OUTPATIENT)
Age: 43
End: 2023-03-23

## 2023-03-23 VITALS
SYSTOLIC BLOOD PRESSURE: 114 MMHG | TEMPERATURE: 98.2 F | HEIGHT: 69 IN | OXYGEN SATURATION: 97 % | WEIGHT: 186 LBS | DIASTOLIC BLOOD PRESSURE: 70 MMHG | BODY MASS INDEX: 27.55 KG/M2 | HEART RATE: 100 BPM

## 2023-03-23 PROCEDURE — 99204 OFFICE O/P NEW MOD 45 MIN: CPT | Mod: 25

## 2023-03-23 PROCEDURE — 95004 PERQ TESTS W/ALRGNC XTRCS: CPT

## 2023-03-23 PROCEDURE — 95018 ALL TSTG PERQ&IQ DRUGS/BIOL: CPT

## 2023-03-23 RX ORDER — LEVOCETIRIZINE DIHYDROCHLORIDE 5 MG/1
5 TABLET ORAL DAILY
Qty: 30 | Refills: 11 | Status: DISCONTINUED | COMMUNITY
Start: 2022-03-15 | End: 2023-03-23

## 2023-03-23 RX ORDER — AZITHROMYCIN 250 MG/1
250 TABLET, FILM COATED ORAL
Qty: 1 | Refills: 0 | Status: DISCONTINUED | COMMUNITY
Start: 2023-03-14 | End: 2023-03-23

## 2023-03-23 RX ORDER — BECLOMETHASONE DIPROPIONATE HFA 40 UG/1
40 AEROSOL, METERED RESPIRATORY (INHALATION) TWICE DAILY
Qty: 1 | Refills: 2 | Status: DISCONTINUED | COMMUNITY
Start: 2022-03-30 | End: 2023-03-23

## 2023-03-23 RX ORDER — BECLOMETHASONE DIPROPIONATE HFA 80 UG/1
80 AEROSOL, METERED RESPIRATORY (INHALATION)
Qty: 10.6 | Refills: 2 | Status: DISCONTINUED | COMMUNITY
Start: 2022-06-14 | End: 2023-03-23

## 2023-03-23 RX ORDER — AZELASTINE HYDROCHLORIDE AND FLUTICASONE PROPIONATE 137; 50 UG/1; UG/1
137-50 SPRAY, METERED NASAL
Qty: 1 | Refills: 3 | Status: DISCONTINUED | COMMUNITY
Start: 2022-07-05 | End: 2023-03-23

## 2023-03-23 RX ORDER — METHYLPREDNISOLONE 4 MG/1
4 TABLET ORAL
Qty: 1 | Refills: 0 | Status: DISCONTINUED | COMMUNITY
Start: 2023-03-13 | End: 2023-03-23

## 2023-03-23 RX ORDER — AMOXICILLIN AND CLAVULANATE POTASSIUM 875; 125 MG/1; MG/1
875-125 TABLET, COATED ORAL
Qty: 14 | Refills: 0 | Status: DISCONTINUED | COMMUNITY
Start: 2023-03-14 | End: 2023-03-23

## 2023-03-23 NOTE — PHYSICAL EXAM
[Alert] : alert [Well Nourished] : well nourished [Healthy Appearance] : healthy appearance [No Acute Distress] : no acute distress [Well Developed] : well developed [Normal Voice/Communication] : normal voice communication [Normal Nasal Mucosa] : the nasal mucosa was normal [No Neck Mass] : no neck mass was observed [No LAD] : no lymphadenopathy [No Thyroid Mass] : no thyroid mass [Normal Rate and Effort] : normal respiratory rhythm and effort [No Crackles] : no crackles [No Retractions] : no retractions [Bilateral Audible Breath Sounds] : bilateral audible breath sounds [Normal Rate] : heart rate was normal  [Normal S1, S2] : normal S1 and S2 [No murmur] : no murmur [Regular Rhythm] : with a regular rhythm [Normal Cervical Lymph Nodes] : cervical [Skin Intact] : skin intact  [No Rash] : no rash [Normal Mood] : mood was normal [Normal Affect] : affect was normal [Judgment and Insight Age Appropriate] : judgement and insight is age appropriate [Alert, Awake, Oriented as Age-Appropriate] : alert, awake, oriented as age appropriate [Boggy Nasal Turbinates] : no boggy and/or pale nasal turbinates [Wheezing] : no wheezing was heard

## 2023-03-23 NOTE — ASSESSMENT
[FreeTextEntry1] : Perennial allergic rhinitis:\par \par Continue Flonase 2 puffs QD each nostril \par RV environmental ID testing \par \par Post COVID exertional SOB - asthma:\par \par Continue Symbicort 80 2 puffs BID\par Continue albuterol 2 puffs QID prn \par \par Elevated troponin level S/P hospitalization - pending cardiac MRI for possible myocarditis. \par \par Food allergy concerns - doubt allergic to these foods - appears to be GERD:\par \par Famotidine 20 mg BID \par \par

## 2023-03-23 NOTE — HISTORY OF PRESENT ILLNESS
[de-identified] : Patient with long history of nasal congestion - some sneezing - symptoms worsen during the spring and summer - he was not taking any regular medications other than prn antihistamines - presently taking Flonase - symptoms are milder during the remainder time of the year.  Snoring worsens with nasal congestion \par \par Patient had COVID 7/20 - coughing - SOB - paO2 93 - fever - weakness - bone and muscle aches - no need for oxygen - he continued with SOB and developed exertional SOB - he started running but was slow running.   Admitted to the hospital x 4 days - he awoke with coughing - fever - SOB with minimal exertion - paO2 86 - COVID negative - found to have pneumonia/atelectasis on CT - treated with antibiotics and Medrol - improved with treatment - patient had been on Symbicort since mid February.    During hospitalization elevated troponin levels - EST - angiogram - echo - CT angiogram - normal - pending MRI to evaluate for myocarditis.   \par \par Patient now concerned about asthma.   Patient feels that he will get throat sense of closure after the ingestion of shrimp - cucumber - tomato - eating the foods separately - tomato/cucumber he has no symptoms.  \par \par Spirometry performed by pulmonary was normal.

## 2023-03-23 NOTE — SOCIAL HISTORY
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

## 2023-03-23 NOTE — REASON FOR VISIT
[Initial Consultation] : an initial consultation for [FreeTextEntry3] : allergies/asthma evaluation

## 2023-03-30 ENCOUNTER — APPOINTMENT (OUTPATIENT)
Dept: ALLERGY | Facility: CLINIC | Age: 43
End: 2023-03-30
Payer: COMMERCIAL

## 2023-03-30 VITALS
WEIGHT: 186 LBS | TEMPERATURE: 97.6 F | SYSTOLIC BLOOD PRESSURE: 112 MMHG | HEART RATE: 100 BPM | OXYGEN SATURATION: 98 % | BODY MASS INDEX: 27.55 KG/M2 | HEIGHT: 69 IN | DIASTOLIC BLOOD PRESSURE: 78 MMHG

## 2023-03-30 PROCEDURE — 99213 OFFICE O/P EST LOW 20 MIN: CPT | Mod: 25

## 2023-03-30 PROCEDURE — 95024 IQ TESTS W/ALLERGENIC XTRCS: CPT

## 2023-03-30 PROCEDURE — 95018 ALL TSTG PERQ&IQ DRUGS/BIOL: CPT

## 2023-03-30 NOTE — ASSESSMENT
[FreeTextEntry1] : Perennial nonallergic rhinitis:\par \par Continue Flonase 2 puffs QD each nostril \par \par Post COVID exertional SOB - asthma:\par \par Continue Symbicort 80 2 puffs BID\par Continue albuterol 2 puffs QID prn \par \par Elevated troponin level S/P hospitalization - pending cardiac MRI for possible myocarditis. \par \par GERD:\par \par Omeprazole 40 mg in AM\par Famotidine 20 mg QHS\par Medications x 1 month\par If not improved GI evaluation.  \par \par

## 2023-03-30 NOTE — SOCIAL HISTORY
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

## 2023-04-01 ENCOUNTER — OUTPATIENT (OUTPATIENT)
Dept: OUTPATIENT SERVICES | Facility: HOSPITAL | Age: 43
LOS: 1 days | Discharge: HOME | End: 2023-04-01

## 2023-04-01 DIAGNOSIS — Z71.1 PERSON WITH FEARED HEALTH COMPLAINT IN WHOM NO DIAGNOSIS IS MADE: ICD-10-CM

## 2023-04-04 ENCOUNTER — LABORATORY RESULT (OUTPATIENT)
Age: 43
End: 2023-04-04

## 2023-04-04 ENCOUNTER — APPOINTMENT (OUTPATIENT)
Dept: PULMONOLOGY | Facility: CLINIC | Age: 43
End: 2023-04-04
Payer: COMMERCIAL

## 2023-04-04 ENCOUNTER — APPOINTMENT (OUTPATIENT)
Dept: HEMATOLOGY ONCOLOGY | Facility: CLINIC | Age: 43
End: 2023-04-04

## 2023-04-04 VITALS
RESPIRATION RATE: 17 BRPM | HEIGHT: 69 IN | OXYGEN SATURATION: 96 % | TEMPERATURE: 97.5 F | SYSTOLIC BLOOD PRESSURE: 145 MMHG | DIASTOLIC BLOOD PRESSURE: 80 MMHG | HEART RATE: 100 BPM

## 2023-04-04 DIAGNOSIS — Z87.01 PERSONAL HISTORY OF PNEUMONIA (RECURRENT): ICD-10-CM

## 2023-04-04 PROCEDURE — 99214 OFFICE O/P EST MOD 30 MIN: CPT

## 2023-04-04 NOTE — DISCUSSION/SUMMARY
[FreeTextEntry1] : REASON FOR CONSULT\par Rafiq Hills is a 42-year-old male who was referred by Charmaine BEAUCHAMP) for cancer genetic counseling and risk assessment due to family history of male and female breast cancer. \par \par RELEVANT MEDICAL HISTORY\par Mr. Hills is a healthy individual who has never had cancer. He has a family history of cancer, see below.\par \par OTHER MEDICAL AND SURGICAL HISTORY:\par HTN. Asthma. HLD. Long-term effects of COVID-19. Salivary glands removed from lip. Shave biopsy (gum), noncancerous. \par \par CANCER SCREENING HISTORY:  \par Colon: None. \par Skin: None. Reported moles on neck (front and back), but is not concerned about them. \par Prostate: None.  \par \par SOCIAL HISTORY:\par •	Tobacco-product use: No\par •	Pediatric Pulmonologist (Keyla)\par \par FAMILY HISTORY:\par Maternal and paternal ancestry was reported as [place]. No Ashkenazi Advent heritage reported. A detailed family history of cancer was ascertained, see below and scanned chart for pedigree. \par \par To Mr. Hills’s knowledge, no one in the family has had germline testing for cancer susceptibility.  \par 	\par 	RISK ASSESSMENT:\par Mr. Hills’s family history of male and female breast cancer is suggestive of more than one hereditary cancer predisposition syndrome. We recommended genetic testing for a breast cancer panel. This test analyzes 14 genes: MOON, BARD1, BRCA1, BRCA2, BRIP1, CDH1, CHEK2, NF1, PALB2, PTEN, RAD51C, RAD51D, STK11, TP53.\par \par We discussed the risks, benefits and limitations, and implications of genetic testing. We also discussed the psychosocial implications of genetic testing. Possible test results were reviewed with Mr. Hills, along with associated medical management options. The Genetic Information Non-discrimination Act (MAGGIE) was also reviewed.\par \par Mr. Hills consented to the above-mentioned genetic testing panel. Blood was drawn in our laboratory and sent to [facility] today. \par \par PLAN:\par \par 1.	Blood drawn today will be sent to [facility] for analysis. \par 2.	We will contact Mr. Hills once the results are available and will schedule a follow-up appointment, as needed. Results generally return in 2-3 weeks from the day the sample kit is mailed.\par \par For any additional questions please call Cancer Genetics at [phone]. \par \par \par Ivan Mayo MS, [facility] [place]par Genetic Counselor, Cancer Genetics\par \par \par CC: \par Charmaine BEAUCHAMP)

## 2023-04-11 ENCOUNTER — TRANSCRIPTION ENCOUNTER (OUTPATIENT)
Age: 43
End: 2023-04-11

## 2023-04-13 ENCOUNTER — APPOINTMENT (OUTPATIENT)
Dept: MRI IMAGING | Facility: CLINIC | Age: 43
End: 2023-04-13
Payer: COMMERCIAL

## 2023-04-13 ENCOUNTER — OUTPATIENT (OUTPATIENT)
Dept: OUTPATIENT SERVICES | Facility: HOSPITAL | Age: 43
LOS: 1 days | End: 2023-04-13
Payer: COMMERCIAL

## 2023-04-13 DIAGNOSIS — R06.2 WHEEZING: ICD-10-CM

## 2023-04-13 PROCEDURE — 75561 CARDIAC MRI FOR MORPH W/DYE: CPT | Mod: 26

## 2023-04-13 PROCEDURE — 75561 CARDIAC MRI FOR MORPH W/DYE: CPT

## 2023-04-13 PROCEDURE — A9585: CPT

## 2023-04-18 ENCOUNTER — APPOINTMENT (OUTPATIENT)
Dept: PULMONOLOGY | Facility: CLINIC | Age: 43
End: 2023-04-18

## 2023-04-24 NOTE — ASSESSMENT
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 Due to his dyspnea on exertion, he had an extensive cardiac workup that was unremarkable, including echo, nuclear stress test, and cardiac cath. Troponin was initially elevated to 150s, improved to 120s. Noted to have low RAP and PCWP on cardiac cath, suggesting dehydration. Given persistent cough and sputum production, he was started on Augmentin and Z-pack, and also completed a medrol dose pack. He is maintained with Symbicort 80-4.5 mcg 2 puffs twice daily, rinses after use. He also takes fluticasone nasal spray twice daily. He continues to have dyspnea on exertion, such as going up a flight of stairs, but symptoms are mostly at baseline. He reports cough only in the morning upon awakening. He recently went to allergist and was told he has reflux and started omeprazole and famotidine. He takes fluticasone nasal spray every morning, but does not take at night. He has no associated wheezing, chest tightness, or coughing during the day. He used to exercise regularly, just started exercising again recently. \par \par PFTs in March 2022 with normal spirometry, normal lung volumes, and normal diffusing capacity. There was no bronchodilator response.\par \par CT Chest March 2023 with dependent patchy and bandlike opacities in the lower lobes due to atelectasis versus pneumonia. No PE. Trace pleural effusions\par \par Assessment:\par Mild persistent asthma\par GERD\par Upper airway cough syndrome\par Recent pneumonia\par \par Plan:\par - Avoid spicy foods/drinks, citrus foods/drinks, tomato-based foods/sauces, caffeine, chocolate, late evening meals, carbonated beverages, alcohol, and fatty foods\par - Continue omeprazole and famotidine as prescribed by Allergy\par - Increase fluticasone to 1 spray per nostril TWICE daily\par - Continue Symbicort 80-4.5 mcg 2 puffs twice daily, rinse after use \par - Repeat CT chest in end of April to assess for resolution of bibasilar atelectasis/pneumonia\par - Follow-up after repeat CT chest

## 2023-04-24 NOTE — HISTORY OF PRESENT ILLNESS
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 Due to his dyspnea on exertion, he had an extensive cardiac workup that was unremarkable, including echo, nuclear stress test, and cardiac cath. Troponin was initially elevated to 150s, improved to 120s. Noted to have low RAP and PCWP on cardiac cath, suggesting dehydration. Given persistent cough and sputum production, he was started on Augmentin and Z-pack, and also completed a medrol dose pack. He is maintained with Symbicort 80-4.5 mcg 2 puffs twice daily, rinses after use. He also takes fluticasone nasal spray twice daily.\par \par He continues to have dyspnea on exertion, such as going up a flight of stairs, but symptoms are mostly at baseline. He reports cough only in the morning upon awakening. He recently went to allergist and was told he has reflux and started omeprazole and famotidine. He takes fluticasone nasal spray every morning, but does not take at night. He has no associated wheezing, chest tightness, or coughing during the day. He used to exercise regularly, just started exercising again recently. \par \par PFTs in March 2022 with normal spirometry, normal lung volumes, and normal diffusing capacity. There was no bronchodilator response.

## 2023-04-24 NOTE — PHYSICAL EXAM
[No Acute Distress] : no acute distress [Well Nourished] : well nourished [Well Developed] : well developed [Normal Oropharynx] : normal oropharynx [Normal Appearance] : normal appearance [Supple] : supple [No Neck Mass] : no neck mass [No JVD] : no jvd [Normal Rate/Rhythm] : normal rate/rhythm [Normal Pulses] : normal pulses [Normal S1, S2] : normal s1, s2 [No Murmurs] : no murmurs [No Resp Distress] : no resp distress [No Acc Muscle Use] : no acc muscle use [Clear to Auscultation Bilaterally] : clear to auscultation bilaterally [No Abnormalities] : no abnormalities [Benign] : benign [Not Tender] : not tender [Normal Gait] : normal gait [No Clubbing] : no clubbing [No Cyanosis] : no cyanosis [No Edema] : no edema [FROM] : FROM [Normal Color/ Pigmentation] : normal color/ pigmentation [No Focal Deficits] : no focal deficits [Cranial Nerves Intact] : cranial nerves intact [Oriented x3] : oriented x3 [Normal Affect] : normal affect

## 2023-05-04 ENCOUNTER — NON-APPOINTMENT (OUTPATIENT)
Age: 43
End: 2023-05-04

## 2023-05-18 ENCOUNTER — EMERGENCY (EMERGENCY)
Facility: HOSPITAL | Age: 43
LOS: 1 days | Discharge: HOME | End: 2023-05-18
Attending: EMERGENCY MEDICINE
Payer: COMMERCIAL

## 2023-05-18 VITALS
TEMPERATURE: 98 F | HEART RATE: 94 BPM | WEIGHT: 175.05 LBS | OXYGEN SATURATION: 97 % | SYSTOLIC BLOOD PRESSURE: 133 MMHG | RESPIRATION RATE: 17 BRPM | DIASTOLIC BLOOD PRESSURE: 81 MMHG | HEIGHT: 69 IN

## 2023-05-18 VITALS
SYSTOLIC BLOOD PRESSURE: 147 MMHG | DIASTOLIC BLOOD PRESSURE: 87 MMHG | OXYGEN SATURATION: 100 % | HEART RATE: 98 BPM | RESPIRATION RATE: 16 BRPM | TEMPERATURE: 98 F

## 2023-05-18 LAB
ALBUMIN SERPL ELPH-MCNC: 4.4 G/DL — SIGNIFICANT CHANGE UP (ref 3.3–5)
ALP SERPL-CCNC: 69 U/L — SIGNIFICANT CHANGE UP (ref 40–120)
ALT FLD-CCNC: 21 U/L — SIGNIFICANT CHANGE UP (ref 10–45)
ANION GAP SERPL CALC-SCNC: 14 MMOL/L — SIGNIFICANT CHANGE UP (ref 5–17)
AST SERPL-CCNC: 17 U/L — SIGNIFICANT CHANGE UP (ref 10–40)
BASOPHILS # BLD AUTO: 0.01 K/UL — SIGNIFICANT CHANGE UP (ref 0–0.2)
BASOPHILS NFR BLD AUTO: 0.1 % — SIGNIFICANT CHANGE UP (ref 0–2)
BILIRUB SERPL-MCNC: 0.4 MG/DL — SIGNIFICANT CHANGE UP (ref 0.2–1.2)
BUN SERPL-MCNC: 12 MG/DL — SIGNIFICANT CHANGE UP (ref 7–23)
CALCIUM SERPL-MCNC: 9.4 MG/DL — SIGNIFICANT CHANGE UP (ref 8.4–10.5)
CHLORIDE SERPL-SCNC: 104 MMOL/L — SIGNIFICANT CHANGE UP (ref 96–108)
CO2 SERPL-SCNC: 22 MMOL/L — SIGNIFICANT CHANGE UP (ref 22–31)
CREAT SERPL-MCNC: 0.72 MG/DL — SIGNIFICANT CHANGE UP (ref 0.5–1.3)
EGFR: 118 ML/MIN/1.73M2 — SIGNIFICANT CHANGE UP
EOSINOPHIL # BLD AUTO: 0.01 K/UL — SIGNIFICANT CHANGE UP (ref 0–0.5)
EOSINOPHIL NFR BLD AUTO: 0.1 % — SIGNIFICANT CHANGE UP (ref 0–6)
GLUCOSE SERPL-MCNC: 101 MG/DL — HIGH (ref 70–99)
HCT VFR BLD CALC: 44.1 % — SIGNIFICANT CHANGE UP (ref 39–50)
HGB BLD-MCNC: 14.7 G/DL — SIGNIFICANT CHANGE UP (ref 13–17)
IMM GRANULOCYTES NFR BLD AUTO: 0.3 % — SIGNIFICANT CHANGE UP (ref 0–0.9)
LYMPHOCYTES # BLD AUTO: 1.65 K/UL — SIGNIFICANT CHANGE UP (ref 1–3.3)
LYMPHOCYTES # BLD AUTO: 14.2 % — SIGNIFICANT CHANGE UP (ref 13–44)
MCHC RBC-ENTMCNC: 30.6 PG — SIGNIFICANT CHANGE UP (ref 27–34)
MCHC RBC-ENTMCNC: 33.3 GM/DL — SIGNIFICANT CHANGE UP (ref 32–36)
MCV RBC AUTO: 91.7 FL — SIGNIFICANT CHANGE UP (ref 80–100)
MONOCYTES # BLD AUTO: 1.03 K/UL — HIGH (ref 0–0.9)
MONOCYTES NFR BLD AUTO: 8.9 % — SIGNIFICANT CHANGE UP (ref 2–14)
NEUTROPHILS # BLD AUTO: 8.84 K/UL — HIGH (ref 1.8–7.4)
NEUTROPHILS NFR BLD AUTO: 76.4 % — SIGNIFICANT CHANGE UP (ref 43–77)
NRBC # BLD: 0 /100 WBCS — SIGNIFICANT CHANGE UP (ref 0–0)
PLATELET # BLD AUTO: 274 K/UL — SIGNIFICANT CHANGE UP (ref 150–400)
POTASSIUM SERPL-MCNC: 3.5 MMOL/L — SIGNIFICANT CHANGE UP (ref 3.5–5.3)
POTASSIUM SERPL-SCNC: 3.5 MMOL/L — SIGNIFICANT CHANGE UP (ref 3.5–5.3)
PROT SERPL-MCNC: 7.4 G/DL — SIGNIFICANT CHANGE UP (ref 6–8.3)
RAPID RVP RESULT: SIGNIFICANT CHANGE UP
RBC # BLD: 4.81 M/UL — SIGNIFICANT CHANGE UP (ref 4.2–5.8)
RBC # FLD: 13.3 % — SIGNIFICANT CHANGE UP (ref 10.3–14.5)
SARS-COV-2 RNA SPEC QL NAA+PROBE: SIGNIFICANT CHANGE UP
SODIUM SERPL-SCNC: 140 MMOL/L — SIGNIFICANT CHANGE UP (ref 135–145)
TROPONIN T, HIGH SENSITIVITY RESULT: 6 NG/L — SIGNIFICANT CHANGE UP (ref 0–51)
WBC # BLD: 11.58 K/UL — HIGH (ref 3.8–10.5)
WBC # FLD AUTO: 11.58 K/UL — HIGH (ref 3.8–10.5)

## 2023-05-18 PROCEDURE — 0225U NFCT DS DNA&RNA 21 SARSCOV2: CPT

## 2023-05-18 PROCEDURE — 80053 COMPREHEN METABOLIC PANEL: CPT

## 2023-05-18 PROCEDURE — 94640 AIRWAY INHALATION TREATMENT: CPT

## 2023-05-18 PROCEDURE — 99285 EMERGENCY DEPT VISIT HI MDM: CPT | Mod: 25

## 2023-05-18 PROCEDURE — 71046 X-RAY EXAM CHEST 2 VIEWS: CPT

## 2023-05-18 PROCEDURE — 99285 EMERGENCY DEPT VISIT HI MDM: CPT

## 2023-05-18 PROCEDURE — 93005 ELECTROCARDIOGRAM TRACING: CPT

## 2023-05-18 PROCEDURE — 84484 ASSAY OF TROPONIN QUANT: CPT

## 2023-05-18 PROCEDURE — 85025 COMPLETE CBC W/AUTO DIFF WBC: CPT

## 2023-05-18 PROCEDURE — 71046 X-RAY EXAM CHEST 2 VIEWS: CPT | Mod: 26

## 2023-05-18 RX ORDER — IPRATROPIUM/ALBUTEROL SULFATE 18-103MCG
3 AEROSOL WITH ADAPTER (GRAM) INHALATION ONCE
Refills: 0 | Status: COMPLETED | OUTPATIENT
Start: 2023-05-18 | End: 2023-05-18

## 2023-05-18 RX ORDER — LEVALBUTEROL HYDROCHLORIDE 1.25 MG/3ML
1.25 SOLUTION RESPIRATORY (INHALATION)
Qty: 2 | Refills: 1 | Status: ACTIVE | COMMUNITY
Start: 2023-05-18 | End: 1900-01-01

## 2023-05-18 RX ADMIN — Medication 3 MILLILITER(S): at 09:16

## 2023-05-18 RX ADMIN — Medication 3 MILLILITER(S): at 08:30

## 2023-05-18 RX ADMIN — Medication 3 MILLILITER(S): at 08:45

## 2023-05-18 NOTE — ED PROVIDER NOTE - PHYSICAL EXAMINATION
Physical Exam:  Gen: NAD, AOx3, non-toxic appearing, able to ambulate without assistance  Head: NCAT  HEENT: EOMI, PEERLA, normal conjunctiva, tongue midline, oral mucosa moist  Lung: no respiratory distress, mild crackles left anterior chest, no wheezing, B/L, speaking in full sentences  CV: RRR, no murmurs, rubs or gallops  Abd: soft, NT, ND, no guarding, no rigidity, no rebound tenderness, no CVA tenderness   MSK: no visible deformities, ROM normal in UE/LE, no back pain  Neuro: No focal sensory or motor deficits  Skin: Warm, well perfused, no rash, no leg swelling  Psych: normal affect, calm

## 2023-05-18 NOTE — ED PROVIDER NOTE - NS ED ROS FT
CONSTITUTIONAL: No fevers, no chills, no lightheadedness, no dizziness  EYES: no visual changes, no eye pain  EARS: no ear drainage, no ear pain, no change in hearing  NOSE: no nasal congestion  MOUTH/THROAT: no sore throat  CV: + chest pain, no palpitations  RESP: + SOB, + cough  GI: No n/v/d, no abd pain  : no dysuria, no hematuria, no flank pain  MSK: no back pain, no extremity pain  SKIN: no rashes  NEURO: no headache, no focal weakness, no decreased sensation/parasthesias   PSYCHIATRIC: no known mental health issues

## 2023-05-18 NOTE — ED PROVIDER NOTE - PROGRESS NOTE DETAILS
Theodora- Discussed care with patients pulmonologist- does not recommend CT scan at this time or dc on antibiotics. Patient will follow up outpatient

## 2023-05-18 NOTE — ED PROVIDER NOTE - PATIENT PORTAL LINK FT
You can access the FollowMyHealth Patient Portal offered by Health system by registering at the following website: [email or web address]. By joining [employer]s FollowMyHealth portal, you will also be able to view your health information using other applications (apps) compatible with our system.

## 2023-05-18 NOTE — ED ADULT NURSE NOTE - NSFALLUNIVINTERV_ED_ALL_ED
Bed/Stretcher in lowest position, wheels locked, appropriate side rails in place/Call bell, personal items and telephone in reach/Instruct patient to call for assistance before getting out of bed/chair/stretcher/Non-slip footwear applied when patient is off stretcher/[place] to call system/Physically safe environment - no spills, clutter or unnecessary equipment/Purposeful proactive rounding/Room/bathroom lighting operational, light cord in reach

## 2023-05-18 NOTE — ED PROVIDER NOTE - ATTENDING CONTRIBUTION TO CARE
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 Feels similar to when he had PNA. Has been taking albuterol without relief, started on prednisone yesterday. Denies fevers, chills, pain, urinary symptoms.  Patient states he has some mild anterior chest pain does not radiate to the arm or jaw at this time.  Patient has not taken any medications. Exam as above. Will obtain ekg, labs including RVP and troponin, cxr, nebs, reassess

## 2023-05-18 NOTE — ED PROVIDER NOTE - NSFOLLOWUPINSTRUCTIONS_ED_ALL_ED_FT
Cough    Coughing is a reflex that clears your throat and your airways. Coughing helps to heal and protect your lungs. It is normal to cough occasionally, but a cough that happens with other symptoms or lasts a long time may be a sign of a condition that needs treatment. Coughing may be caused by infections, asthma or COPD, smoking, postnasal drip, gastroesophageal reflux, as well as other medical conditions. Take medicines only as instructed by your health care provider. Avoid environments or triggers that causes you to cough at work or at home.    SEEK IMMEDIATE MEDICAL CARE IF YOU HAVE ANY OF THE FOLLOWING SYMPTOMS: coughing up blood, shortness of breath, rapid heart rate, chest pain, unexplained weight loss or night sweats.    Shortness of breath    Shortness of breath (dyspnea) means you have trouble breathing and could indicate a medical problem. Causes include lung disease, heart disease, low amount of red blood cells (anemia), poor physical fitness, being overweight, smoking, etc. Your health care provider today may not be able to find a cause for your shortness of breath after your exam. In this case, it is important to have a follow-up exam with your primary care physician as instructed. If medicines were prescribed, take them as directed for the full length of time directed. Refrain from tobacco products.    SEEK IMMEDIATE MEDICAL CARE IF YOU HAVE ANY OF THE FOLLOWING SYMPTOMS: worsening shortness of breath, chest pain, back pain, abdominal pain, fever, coughing up blood, lightheadedness/dizziness.

## 2023-05-18 NOTE — ED PROVIDER NOTE - EKG #1 DATE/TIME
[FreeTextEntry1] : 63 year old female with history of Smoking, CAD s/p PCI, HTN, HLD, COPD, \par Depression, Anxiety and Alcoholism presented with right sided weakness and \par numbness. She was Code Stroke in ER. CT showed + defect, M2 L MCA \par occlusion/Stenosis. She was given tPA and was admitted to ICU. She was started \par on Cardene Infusion for BP management and it has been weaned off. Symptoms are \par improving. Repeat CT Head with no hemorrhage. Downgraded to Medicine. TTE with \par EF of 40-45% with wall motion abnormalities, cardiology consulted. MRI of the \par brain pending \par \par  18-May-2023 08:57

## 2023-05-18 NOTE — ED PROVIDER NOTE - CLINICAL SUMMARY MEDICAL DECISION MAKING FREE TEXT BOX
Patient presents emergency department complaining of shortness of breath and tachypnea.  Patient is hemodynamically stable and afebrile on presentation.  Will obtain EKG to evaluate patient chest pain patient physical exam significant mild anterior crackles on the left side however no wheezing.  Patient is satting well on room air.  Given patient's history and presenting symptoms we will treat with DuoNebs and reevaluate patient we will obtain x-ray of the chest to rule out pneumonia, basic labs to rule out any other infectious etiologies.  We will also be obtained for part of ACS work-up.  Pending labs and imaging for further disposition.

## 2023-05-18 NOTE — ED PROVIDER NOTE - OBJECTIVE STATEMENT
Patient is a 42-year-old male with past medical history of bilateral pneumonia admitted in the hospital for treatment, asthma post-COVID 2020 who presents emergency department complaining of shortness of breath for the last 3 days.  Patient states that he has been more tachypneic and short of breath and it was worse last night.  Patient states this feels similar to when he was diagnosed with bilateral pneumonia.  Patient has been taking his albuterol with little relief.  Patient denies any fevers, chills, pain, urinary symptoms.  Patient states he has some mild anterior chest pain does not radiate to the arm or jaw at this time.  Patient has not taken any medications.       Patient is a 42-year-old male with past medical history of bilateral pneumonia admitted in the hospital for treatment, asthma post-COVID 2020 who presents emergency department complaining of shortness of breath for the last 3 days.  Patient states that he has been more tachypneic and short of breath and it was worse last night.  Patient states this feels similar to when he was diagnosed with bilateral pneumonia.  Patient has been taking his albuterol with little relief.  Patient denies any fevers, chills, pain, urinary symptoms.  Patient states he has some mild anterior chest pain does not radiate to the arm or jaw at this time.  Patient has not taken any medications.

## 2023-05-21 ENCOUNTER — EMERGENCY (EMERGENCY)
Facility: HOSPITAL | Age: 43
LOS: 1 days | Discharge: HOME | End: 2023-05-21
Attending: EMERGENCY MEDICINE
Payer: COMMERCIAL

## 2023-05-21 VITALS
HEART RATE: 97 BPM | DIASTOLIC BLOOD PRESSURE: 89 MMHG | TEMPERATURE: 98 F | OXYGEN SATURATION: 97 % | WEIGHT: 179.9 LBS | HEIGHT: 69 IN | SYSTOLIC BLOOD PRESSURE: 145 MMHG | RESPIRATION RATE: 20 BRPM

## 2023-05-21 VITALS
OXYGEN SATURATION: 96 % | HEART RATE: 112 BPM | SYSTOLIC BLOOD PRESSURE: 128 MMHG | TEMPERATURE: 98 F | DIASTOLIC BLOOD PRESSURE: 85 MMHG | RESPIRATION RATE: 18 BRPM

## 2023-05-21 LAB
ALBUMIN SERPL ELPH-MCNC: 4.2 G/DL — SIGNIFICANT CHANGE UP (ref 3.3–5)
ALP SERPL-CCNC: 60 U/L — SIGNIFICANT CHANGE UP (ref 40–120)
ALT FLD-CCNC: 20 U/L — SIGNIFICANT CHANGE UP (ref 10–45)
ANION GAP SERPL CALC-SCNC: 17 MMOL/L — SIGNIFICANT CHANGE UP (ref 5–17)
AST SERPL-CCNC: 15 U/L — SIGNIFICANT CHANGE UP (ref 10–40)
BASE EXCESS BLDV CALC-SCNC: 1.2 MMOL/L — SIGNIFICANT CHANGE UP (ref -2–3)
BASOPHILS # BLD AUTO: 0.03 K/UL — SIGNIFICANT CHANGE UP (ref 0–0.2)
BASOPHILS NFR BLD AUTO: 0.3 % — SIGNIFICANT CHANGE UP (ref 0–2)
BILIRUB SERPL-MCNC: 0.4 MG/DL — SIGNIFICANT CHANGE UP (ref 0.2–1.2)
BUN SERPL-MCNC: 12 MG/DL — SIGNIFICANT CHANGE UP (ref 7–23)
CA-I SERPL-SCNC: 1.23 MMOL/L — SIGNIFICANT CHANGE UP (ref 1.15–1.33)
CALCIUM SERPL-MCNC: 9.1 MG/DL — SIGNIFICANT CHANGE UP (ref 8.4–10.5)
CHLORIDE BLDV-SCNC: 106 MMOL/L — SIGNIFICANT CHANGE UP (ref 96–108)
CHLORIDE SERPL-SCNC: 108 MMOL/L — SIGNIFICANT CHANGE UP (ref 96–108)
CO2 BLDV-SCNC: 28 MMOL/L — HIGH (ref 22–26)
CO2 SERPL-SCNC: 20 MMOL/L — LOW (ref 22–31)
CREAT SERPL-MCNC: 0.86 MG/DL — SIGNIFICANT CHANGE UP (ref 0.5–1.3)
CRP SERPL-MCNC: <3 MG/L — SIGNIFICANT CHANGE UP (ref 0–4)
EGFR: 112 ML/MIN/1.73M2 — SIGNIFICANT CHANGE UP
EOSINOPHIL # BLD AUTO: 0.06 K/UL — SIGNIFICANT CHANGE UP (ref 0–0.5)
EOSINOPHIL NFR BLD AUTO: 0.6 % — SIGNIFICANT CHANGE UP (ref 0–6)
GAS PNL BLDV: 139 MMOL/L — SIGNIFICANT CHANGE UP (ref 136–145)
GAS PNL BLDV: SIGNIFICANT CHANGE UP
GAS PNL BLDV: SIGNIFICANT CHANGE UP
GLUCOSE BLDV-MCNC: 92 MG/DL — SIGNIFICANT CHANGE UP (ref 70–99)
GLUCOSE SERPL-MCNC: 94 MG/DL — SIGNIFICANT CHANGE UP (ref 70–99)
HCO3 BLDV-SCNC: 27 MMOL/L — SIGNIFICANT CHANGE UP (ref 22–29)
HCT VFR BLD CALC: 40.9 % — SIGNIFICANT CHANGE UP (ref 39–50)
HCT VFR BLDA CALC: 43 % — SIGNIFICANT CHANGE UP (ref 39–51)
HGB BLD CALC-MCNC: 14.2 G/DL — SIGNIFICANT CHANGE UP (ref 12.6–17.4)
HGB BLD-MCNC: 13.5 G/DL — SIGNIFICANT CHANGE UP (ref 13–17)
HPIV3 RNA SPEC QL NAA+PROBE: DETECTED
IMM GRANULOCYTES NFR BLD AUTO: 0.4 % — SIGNIFICANT CHANGE UP (ref 0–0.9)
LACTATE BLDV-MCNC: 1.9 MMOL/L — SIGNIFICANT CHANGE UP (ref 0.5–2)
LYMPHOCYTES # BLD AUTO: 2.89 K/UL — SIGNIFICANT CHANGE UP (ref 1–3.3)
LYMPHOCYTES # BLD AUTO: 31.1 % — SIGNIFICANT CHANGE UP (ref 13–44)
MCHC RBC-ENTMCNC: 30.4 PG — SIGNIFICANT CHANGE UP (ref 27–34)
MCHC RBC-ENTMCNC: 33 GM/DL — SIGNIFICANT CHANGE UP (ref 32–36)
MCV RBC AUTO: 92.1 FL — SIGNIFICANT CHANGE UP (ref 80–100)
MONOCYTES # BLD AUTO: 0.76 K/UL — SIGNIFICANT CHANGE UP (ref 0–0.9)
MONOCYTES NFR BLD AUTO: 8.2 % — SIGNIFICANT CHANGE UP (ref 2–14)
NEUTROPHILS # BLD AUTO: 5.51 K/UL — SIGNIFICANT CHANGE UP (ref 1.8–7.4)
NEUTROPHILS NFR BLD AUTO: 59.4 % — SIGNIFICANT CHANGE UP (ref 43–77)
NRBC # BLD: 0 /100 WBCS — SIGNIFICANT CHANGE UP (ref 0–0)
NT-PROBNP SERPL-SCNC: 156 PG/ML — SIGNIFICANT CHANGE UP (ref 0–300)
PCO2 BLDV: 44 MMHG — SIGNIFICANT CHANGE UP (ref 42–55)
PH BLDV: 7.39 — SIGNIFICANT CHANGE UP (ref 7.32–7.43)
PLATELET # BLD AUTO: 261 K/UL — SIGNIFICANT CHANGE UP (ref 150–400)
PO2 BLDV: 46 MMHG — HIGH (ref 25–45)
POTASSIUM BLDV-SCNC: 3.5 MMOL/L — SIGNIFICANT CHANGE UP (ref 3.5–5.1)
POTASSIUM SERPL-MCNC: 3.6 MMOL/L — SIGNIFICANT CHANGE UP (ref 3.5–5.3)
POTASSIUM SERPL-SCNC: 3.6 MMOL/L — SIGNIFICANT CHANGE UP (ref 3.5–5.3)
PROCALCITONIN SERPL-MCNC: 0.03 NG/ML — SIGNIFICANT CHANGE UP (ref 0.02–0.1)
PROT SERPL-MCNC: 6.7 G/DL — SIGNIFICANT CHANGE UP (ref 6–8.3)
RAPID RVP RESULT: DETECTED
RBC # BLD: 4.44 M/UL — SIGNIFICANT CHANGE UP (ref 4.2–5.8)
RBC # FLD: 13.7 % — SIGNIFICANT CHANGE UP (ref 10.3–14.5)
SAO2 % BLDV: 80.6 % — SIGNIFICANT CHANGE UP (ref 67–88)
SARS-COV-2 RNA SPEC QL NAA+PROBE: SIGNIFICANT CHANGE UP
SODIUM SERPL-SCNC: 145 MMOL/L — SIGNIFICANT CHANGE UP (ref 135–145)
WBC # BLD: 9.29 K/UL — SIGNIFICANT CHANGE UP (ref 3.8–10.5)
WBC # FLD AUTO: 9.29 K/UL — SIGNIFICANT CHANGE UP (ref 3.8–10.5)

## 2023-05-21 PROCEDURE — 99285 EMERGENCY DEPT VISIT HI MDM: CPT

## 2023-05-21 PROCEDURE — 71046 X-RAY EXAM CHEST 2 VIEWS: CPT | Mod: 26

## 2023-05-21 PROCEDURE — 82803 BLOOD GASES ANY COMBINATION: CPT

## 2023-05-21 PROCEDURE — 85014 HEMATOCRIT: CPT

## 2023-05-21 PROCEDURE — 86140 C-REACTIVE PROTEIN: CPT

## 2023-05-21 PROCEDURE — 93005 ELECTROCARDIOGRAM TRACING: CPT

## 2023-05-21 PROCEDURE — 83880 ASSAY OF NATRIURETIC PEPTIDE: CPT

## 2023-05-21 PROCEDURE — 85025 COMPLETE CBC W/AUTO DIFF WBC: CPT

## 2023-05-21 PROCEDURE — 0225U NFCT DS DNA&RNA 21 SARSCOV2: CPT

## 2023-05-21 PROCEDURE — 84132 ASSAY OF SERUM POTASSIUM: CPT

## 2023-05-21 PROCEDURE — 94640 AIRWAY INHALATION TREATMENT: CPT

## 2023-05-21 PROCEDURE — 82330 ASSAY OF CALCIUM: CPT

## 2023-05-21 PROCEDURE — 84295 ASSAY OF SERUM SODIUM: CPT

## 2023-05-21 PROCEDURE — 99285 EMERGENCY DEPT VISIT HI MDM: CPT | Mod: 25

## 2023-05-21 PROCEDURE — 82435 ASSAY OF BLOOD CHLORIDE: CPT

## 2023-05-21 PROCEDURE — 84145 PROCALCITONIN (PCT): CPT

## 2023-05-21 PROCEDURE — 83605 ASSAY OF LACTIC ACID: CPT

## 2023-05-21 PROCEDURE — 82947 ASSAY GLUCOSE BLOOD QUANT: CPT

## 2023-05-21 PROCEDURE — 85018 HEMOGLOBIN: CPT

## 2023-05-21 PROCEDURE — 71046 X-RAY EXAM CHEST 2 VIEWS: CPT

## 2023-05-21 PROCEDURE — 80053 COMPREHEN METABOLIC PANEL: CPT

## 2023-05-21 RX ORDER — IPRATROPIUM/ALBUTEROL SULFATE 18-103MCG
3 AEROSOL WITH ADAPTER (GRAM) INHALATION ONCE
Refills: 0 | Status: COMPLETED | OUTPATIENT
Start: 2023-05-21 | End: 2023-05-21

## 2023-05-21 RX ADMIN — Medication 60 MILLIGRAM(S): at 09:07

## 2023-05-21 RX ADMIN — Medication 3 MILLILITER(S): at 08:00

## 2023-05-21 RX ADMIN — Medication 3 MILLILITER(S): at 08:30

## 2023-05-21 RX ADMIN — Medication 3 MILLILITER(S): at 09:07

## 2023-05-21 NOTE — ED PROVIDER NOTE - CLINICAL SUMMARY MEDICAL DECISION MAKING FREE TEXT BOX
42-year-old male with a past medical history of pneumonia asthma post-COVID in 2020 presents to the ER today complaining of cough and shortness of breath for the past 5 days. On exam patient is a well-appearing male in no acute distress.  Some end expiratory wheezing was appreciated on lung auscultation.  Plan to give DuoNebs basic blood test chest x-ray and reassess given this is a second visit for the same complaint over the past couple of days patient may need CT imaging should chest x-ray be negative. 42-year-old male with a past medical history of pneumonia asthma post-COVID in 2020 presents to the ER today complaining of cough and shortness of breath for the past 5 days. On exam patient is a well-appearing male in no acute distress.  Some end expiratory wheezing was appreciated on lung auscultation.  Plan to give DuoNebs basic blood test chest x-ray and reassess given this is a second visit for the same complaint over the past couple of days patient may need CT imaging should chest x-ray be negative.  Attending Dina Francisco: 41 yo  male h/o pna, asthma presenting with sob, cough, and wheezing. symptoms started a couple of days ago. pt has been taking duonebs and steroids with improvement and then develops symptoms again approximately 2 hours later. sats at home 95%. upn arrival pt nontoxic appearing, without evidence of respiratory distress or hypoxia. pt given duonebs with increased wheezing. no recent travel, lower ext edema or risk factors for PE. pt afebrile, without productive cough. labs without sig leukocytosis. cxr performed without evidence of pna. no chest pain. rvp sent and positive for parainfluenza virus which is likely exacerbating his asthma. pt observed [facility] ed without evidence of respiratory distress. ambulating without hypoxia. will follow up with pulmonologist

## 2023-05-21 NOTE — ED ADULT NURSE NOTE - OBJECTIVE STATEMENT
42 year old male with PMH of asthma presents to the ED ambulatory through triage complaining of SOB x 3 days, last seen 3 days ago for same symptoms. 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. Pt. states he had COVID about 3 years ago and had developed chronic shortness of breath with exertion but was minimal and was manageable with albuterol and Albuterol was not working. He did admit that he stopped Symbicort but he put himself back on it a week ago and it was still not improving. The patient also complains of "feeling hot" and also has a frontal headache during the last week, he initially thought that it was a viral infection. Pt. is mainting oxygen saturation on room air and breathing is regular in no acute distress. VSS. Denies other URI symptoms such as runny nose or cough. Patient denies fever, dizziness, chest pain, n/v/d. MD SANG Francisco at bedside for assessment and discussion of plan of care.

## 2023-05-21 NOTE — ED PROVIDER NOTE - OBJECTIVE STATEMENT
42-year-old male with a past medical history of pneumonia asthma post-COVID in 2020 presents to the ER today complaining of cough and shortness of breath for the past 5 days.  Patient was evaluated in the ER on 18 May at which time he was discharged on p.o. steroids.  Patient states that he is currently taking 60 mg of prednisone without relief of symptoms.  Patient states that he has been taking DuoNebs without relief of symptoms.  Patient is a physician by profession.  Denies any fevers chills night sweats nausea vomiting diarrhea.  States that when he self auscultated he heard crackles in his lower bases and was concerned he may have developed a pneumonia again.  Denies any history of DVT or PE in the past.  No lower extremity swelling or calf tenderness.  2 months ago when he presented with similar symptoms he had a cardiac catheterization which was negative.

## 2023-05-21 NOTE — ED ADULT TRIAGE NOTE - CHIEF COMPLAINT QUOTE
Shortness of breath since Tuesday; came to ED on Thursday was discharged on PO steroids. PMH of asthma.

## 2023-05-21 NOTE — ED PROVIDER NOTE - NSFOLLOWUPINSTRUCTIONS_ED_ALL_ED_FT
To help treat airway tightening, use albuterol.  For the first 2-3 days, use it every 4 hours around the clock.  If doing well, you can then space it to every 6 hours for the following day.  If that goes well, space to three times a day only while awake.  If still doing well, you can just use it every 4 hours as needed after that.    If you notice that you are having trouble breathing, have very heavy breathing, are getting tired from breathing, turn blue, or need albuterol more often than every 4 hours, you should return for evaluation.  Otherwise, follow up with your pulmonologist in 2-3 days.       -- This is likely a virus.  There is no direct treatment for a virus, and antibiotics are not effective.    -- Please follow up with your doctor(s) within 3 days, but seek care sooner if your symptoms are worsening.  -- Rest, increase your fluid intake and avoid dehydration & alcohol.   -- It is very important to be diligent about hand washing & hygiene to avoid spreading the illness to others.  -- You were given a copy of the results from any tests performed today in the Emergency Department which have results available.  Show these to your doctor(s).   Some of the tests we sent may not have results yet so please call or have your doctor call the Emergency Department to follow up on all results.  -- If you are having any worsening or continued fever, chills, weakness, nausea, vomiting, abdominal pain, please see your doctor or return to the Emergency Department.  -- Please continue taking your home medications as directed.  Do not use alcohol when taking any medication (especially antibiotics, tylenol, or pain medication) unless you check with a doctor/pharmacist. To help treat airway tightening, use albuterol.  For the first 2-3 days, use it every 4 hours around the clock.  If doing well, you can then space it to every 6 hours for the following day.  If that goes well, space to three times a day only while awake.  If still doing well, you can just use it every 4 hours as needed after that.    If you notice that you are having trouble breathing, have very heavy breathing, are getting tired from breathing, turn blue, or need albuterol more often than every 4 hours, you should return for evaluation.  Otherwise, follow up with your pulmonologist in 2-3 days.     How Do I Use a Peak Flow Meter?  Place the indicator at the base of the numbered scale.  Stand up.  Take a deep breath.  Place the meter in your mouth between your teeth and close your lips around the mouthpiece. Do not let your tongue block the hole in the mouthpiece.  Blow out as hard and fast as you can in a single blow.  Write down the number you get. But if you cough as you use the meter or make a mistake, don’t use that number. Blow into the meter again and get a new reading.  Immediately repeat steps 1 through 6 two more times.  Write down the highest of the three numbers in your asthma diary.    -- This is likely a virus.  There is no direct treatment for a virus, and antibiotics are not effective.    -- Please follow up with your doctor(s) within 3 days, but seek care sooner if your symptoms are worsening.  -- Rest, increase your fluid intake and avoid dehydration & alcohol.   -- It is very important to be diligent about hand washing & hygiene to avoid spreading the illness to others.  -- You were given a copy of the results from any tests performed today in the Emergency Department which have results available.  Show these to your doctor(s).   Some of the tests we sent may not have results yet so please call or have your doctor call the Emergency Department to follow up on all results.  -- If you are having any worsening or continued fever, chills, weakness, nausea, vomiting, abdominal pain, please see your doctor or return to the Emergency Department.  -- Please continue taking your home medications as directed.  Do not use alcohol when taking any medication (especially antibiotics, tylenol, or pain medication) unless you check with a doctor/pharmacist.

## 2023-05-21 NOTE — ED PROVIDER NOTE - PHYSICAL EXAMINATION
General: Patient awake alert NAD.   HEENT: normocephalic, atraumatic, EOMI, MMM   Cardiac: RRR, S1, S2, no murmur.   LUNGS: end exp wheezing, no rhonchi, speaking full sentences.     Abdomen: soft NT, ND, no rebound no guarding.   EXT: Moving all extremities   Neuro: A&Ox3, no focal neurological deficits   Skin: warm, dry, no rash. General: Patient awake alert NAD.   HEENT: normocephalic, atraumatic, EOMI, MMM   Cardiac: RRR, S1, S2, no murmur.   LUNGS: end exp wheezing, no rhonchi, speaking full sentences.     Abdomen: soft NT, ND, no rebound no guarding.   EXT: Moving all extremities   Neuro: A&Ox3, no focal neurological deficits   Skin: warm, dry, no rash.  Attending Dina Francisco: Gen: NAD, heent: atrauamtic, eomi, perrla, mmm, op pink, uvula midline, neck; nttp, no nuchal rigidity, chest: nttp, no crepitus, cv: rrr, no murmurs, lungs: ctab,initally after duonebs began with wheezing abd: soft, nontender, nondistended, no peritoneal signs, no guarding, ext: wwp, neg homans, skin: no rash, neuro: awake and alert, following commands, speech clear, sensation and strength intact, no focal deficits

## 2023-05-21 NOTE — ED ADULT NURSE NOTE - NSFALLUNIVINTERV_ED_ALL_ED
Bed/Stretcher in lowest position, wheels locked, appropriate side rails in place/Call bell, personal items and telephone in reach/Instruct patient to call for assistance before getting out of bed/chair/stretcher/Non-slip footwear applied when patient is off stretcher/[place] to call system/Physically safe environment - no spills, clutter or unnecessary equipment/Purposeful proactive rounding/Room/bathroom lighting operational, light cord in reach

## 2023-05-21 NOTE — ED ADULT NURSE NOTE - CAS DISCH CONDITION
Interval HPI/Overnight Events: No acute events. Completed lunch, dinner, 50% of snack, and for breakfast had bagel plus supplement. No headache, no dizziness, no chest pain, no shortness of breath, no abdominal pain, no swelling of extremities.     Allergies    No Known Allergies    Intolerances      MEDICATIONS  (STANDING):    MEDICATIONS  (PRN):      Therapist:     Changes to Medications/Medical/Surgical/Social/Family History:  [x] None    REVIEW OF SYSTEMS: negative, except for those marked abnormal:  General:		no fevers, no complaints                                      [] Abnormal:  Pulmonary:	no trouble breathing, no shortness of breath  [] Abnormal:  Cardiac:		no palpitations, no chest pain                             [] Abnormal:  Gastrointestinal:	no abdominal pain                                                 [] Abnormal:  Skin:		report no rashes	                                          [] Abnormal:  Psychiatric:	no thoughts of hurting self or others	 [] Abnormal:    Vital Signs Last 24 Hrs  T(C): 36.5 (2019 06:05), Max: 36.8 (2019 09:26)  T(F): 97.7 (2019 06:05), Max: 98.2 (2019 09:26)  HR: 65 (2019 06:05) (65 - 93)  HR low overnight 49  BP: 90/51 (2019 06:05) (90/51 - 114/71)  Orthostatics: lying 90/51 HR 65, standing 94/49 HR 99  RR: 20 (2019 06:05) (20 - 24)  SpO2: 100% (2019 06:05) (98% - 100%)  Drug Dosing Weight  Height (cm): 159.5 (2019 18:40)  Weight (kg): 49.4 (2019 18:40)  BMI (kg/m2): 19.4 (2019 18:40)  BSA (m2): 1.49 (2019 18:40)    Daily Weight in Gm: 44657 (2019 06:27), Weight in k (2019 06:27), Weight in Gm: 48955 (2019 06:29)    PHYSICAL EXAM:  All physical exam findings normal, except those marked:  General:	                    No apparent distress, thin  .		[] Abnormal:  HEENT:	                    Normal: EOMI, clear conjunctiva, oral pharynx clear  .		[] Abnormal:  .		[] Parotid enlargement		[] Enamel erosion  Neck		Normal: supple, no cervical adenopathy, no thyroid enlargement  .		[] Abnormal:  Cardiovascular	Normal: regular rate, normal S1, S2, no murmurs  .		[] Abnormal:  Respiratory	Normal: normal respiratory pattern, CTA B/L  .		[] Abnormal:  Abdominal	                    Normal: soft, ND, NT, bowel sounds present, no masses, no organomegaly  .		[] Abnormal:  		Deferred  Extremities	Normal: FROM x4, no cyanosis, edema or tenderness  .		[] Abnormal:  Skin		Normal: intact and not indurated, no rash  .		[] Abnormal:  .		[x] Acrocyanosis		[] Lanugo	[] Ifeanyi’s signs  Neurologic	                    Normal: awake, alert, affect appropriate, no acute change from baseline  .		[] Abnormal:    IMAGING STUDIES:    Lab Results        139  |  104  |  18  ----------------------------<  87  4.0   |  23  |  0.66    Ca    9.7      2019 06:40  Phos  4.2     11-12  Mg     2.0     -            Parent/Guardian updated:	[x] Yes    Yamilex Abbott MD  Adolescent Medicine [employer]

## 2023-05-21 NOTE — ED PROVIDER NOTE - PROGRESS NOTE DETAILS
Attending Dina Francisco: pt given duonebs and now with wheezing in the lower lobes. saturating well. Attending Dina Francisco: pt received duonebs. on repeat exam pt well appearing. not tachpnic. moving air, no evidence of retractions. d/w pt obtaining ct scan to further evaluate as pt states only  has some improvement. pt prefers to hold off as feels this is likely his asthma. will call his pulmonologist to discuss. no evidence of hypoxia. will obtain peak flow Diya Luke MD (PGY3) -  Called pt's pulmonologist again. Awaiting call back. Diya Luke MD (PGY3) -  Called pt's pulmonologist. Have not heard back yet. Diya Luke MD (PGY3) -  Pulmonary was consulted, but still have not seen the patient. Pt seen & reassessed.  Pt symptomatically improved.  NAD, pt tolerated PO & ambulated w/steady unassisted gait in the ED.  We discussed the results of ED w/u w/patient (incl. presumptive Emergency Department dx, associated anticipatory guidance, stressing importance of prompt f/u, return precautions), & gave them a copy of results.  Patient verbalized understanding of ED course & agreed with our f/u recommendations, has decisional making capacity.  Pt st they will f/u w/PMD and pulmonologist within the next 3 days; pt agrees to call today or tomorrow for an appointment. Pt agrees to return to the ED if there is any worsening or concerning symptoms.

## 2023-05-21 NOTE — ED PROVIDER NOTE - ATTENDING CONTRIBUTION TO CARE
Attending MD Dina Francisco:  I personally have seen and examined this patient.  Resident note reviewed and agree on plan of care and except where noted.  See HPI, PE, and MDM for details.

## 2023-05-21 NOTE — ED PROVIDER NOTE - DISCHARGE DATE
Palliative NP met w/pt and pt's wife and they are interested in hospice at home and would like a referral to [facility]. NP provided CG list to wife. Referral sent to [facility] via [facility].  FIORELLA Rod 11/1/2022 10:57 AM    21-May-2023

## 2023-05-21 NOTE — ED ADULT NURSE REASSESSMENT NOTE - NS ED NURSE REASSESS COMMENT FT1
Report received from DANA Goodwin. Pt received A&Ox3, IV intact and patent, VSS, pt denies pain/discomfort, bed locked and in lowest position, call bell within reach and pt instructed on use, safety and comfort measures maintained, pending dispo at this time.

## 2023-05-21 NOTE — ED PROVIDER NOTE - PATIENT PORTAL LINK FT
You can access the FollowMyHealth Patient Portal offered by [facility] by registering at the following website: [email or web address]. By joining [employer]’s FollowMyHealth portal, you will also be able to view your health information using other applications (apps) compatible with our system.

## 2023-05-23 ENCOUNTER — APPOINTMENT (OUTPATIENT)
Dept: PULMONOLOGY | Facility: CLINIC | Age: 43
End: 2023-05-23
Payer: COMMERCIAL

## 2023-05-23 VITALS
WEIGHT: 187 LBS | OXYGEN SATURATION: 97 % | HEIGHT: 69 IN | SYSTOLIC BLOOD PRESSURE: 143 MMHG | HEART RATE: 105 BPM | BODY MASS INDEX: 27.7 KG/M2 | RESPIRATION RATE: 18 BRPM | DIASTOLIC BLOOD PRESSURE: 91 MMHG

## 2023-05-23 DIAGNOSIS — U09.9 OTHER FORMS OF DYSPNEA: ICD-10-CM

## 2023-05-23 DIAGNOSIS — R06.09 OTHER FORMS OF DYSPNEA: ICD-10-CM

## 2023-05-23 PROCEDURE — 99214 OFFICE O/P EST MOD 30 MIN: CPT

## 2023-05-23 NOTE — PHYSICAL EXAM
[No Acute Distress] : no acute distress [Well Nourished] : well nourished [Well Developed] : well developed [Normal Oropharynx] : normal oropharynx [Normal Appearance] : normal appearance [Supple] : supple [No Neck Mass] : no neck mass [No JVD] : no jvd [Normal Rate/Rhythm] : normal rate/rhythm [Normal Pulses] : normal pulses [Normal S1, S2] : normal s1, s2 [No Murmurs] : no murmurs [No Resp Distress] : no resp distress [No Acc Muscle Use] : no acc muscle use [No Abnormalities] : no abnormalities [Benign] : benign [Not Tender] : not tender [Normal Gait] : normal gait [No Clubbing] : no clubbing [No Cyanosis] : no cyanosis [No Edema] : no edema [FROM] : FROM [Normal Color/ Pigmentation] : normal color/ pigmentation [No Focal Deficits] : no focal deficits [Cranial Nerves Intact] : cranial nerves intact [Oriented x3] : oriented x3 [Normal Affect] : normal affect [TextBox_68] : faint end-expiratory wheezing and end-inspiratory squeaks; no rales

## 2023-05-23 NOTE — HISTORY OF PRESENT ILLNESS
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 He was given multiple nebs and prednisone 60 mg po initially without significant improvement. He returned to the ED on 5/21 and found on RVP to have parainfluenza. He is maintained on Symbicort 80-4.5 mcg 2 puffs twice daily, rinses after use. He also takes fluticasone nasal spray twice daily. His wheezing currently somewhat improved and he feels he is opening up more. He remains on prednisone 60 mg daily for the last 4 days, also took 40 mg for 2 days prior. He took the Duonebs about 5 times yesterday, and this morning once so far. Never had any fevers. Cough is productive of white-yellow sputum production and he can bring up phlegm easier currently. \par \par He was previously hospitalized in March 2023 with GUZMAN, cough with sputum production, and bibasilar pneumonia/atelectasis on CT Chest. cardiac catheterization which was negative.hospitalization for dyspnea on exertion, cough with sputum production, and bibasilar pneumonia/atelectasis on CT chest. Due to his dyspnea on exertion, he had an extensive cardiac workup that was unremarkable, including echo, nuclear stress test, and cardiac cath. Troponin was initially elevated to 150s, improved to 120s. Noted to have low RAP and PCWP on cardiac cath, suggesting dehydration. Given persistent cough and sputum production, he was started on Augmentin and Z-pack, and also completed a medrol dose pack.\par \par He reports resolved acid reflux. He is taking lansoprazole now while on the steroids. Not taking famotidine currently. For his upper airway cough syndrome, he takes fluticasone nasal spray. He used to exercise regularly, just started exercising again recently. \par \par PFTs in March 2022 with normal spirometry, normal lung volumes, and normal diffusing capacity. There was no bronchodilator response.

## 2023-05-23 NOTE — ASSESSMENT
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 He was eventually found to have Parainfluenza. Currently completing prednisone taper. Remains on Symbicort and fluticasone nasal spray. Overall feels improved very slowly though. \par \par He was previously hospitalized in March 2023 with GUZMAN, cough with sputum production, and bibasilar pneumonia/atelectasis on CT Chest. cardiac catheterization which was negative.hospitalization for dyspnea on exertion, cough with sputum production, and bibasilar pneumonia/atelectasis on CT chest. Due to his dyspnea on exertion, he had an extensive cardiac workup that was unremarkable, including echo, nuclear stress test, and cardiac cath. Troponin was initially elevated to 150s, improved to 120s. Noted to have low RAP and PCWP on cardiac cath, suggesting dehydration. Given persistent cough and sputum production, he was started on Augmentin and Z-pack, and also completed a medrol dose pack.\par \par CT Chest March 2023 with dependent patchy and bandlike opacities in the lower lobes due to atelectasis versus pneumonia. No PE. Trace pleural effusions.\par \par PFTs in March 2022 with normal spirometry, normal lung volumes, and normal diffusing capacity. There was no bronchodilator response\par \par Assessment:\par Parainfluenza viral URI\par Acute asthma exacerbation\par Severe persistent asthma\par Allergic rhinitis\par History of bibasilar pneumonia\par \par Plan:\par - Decrease prednisone to 40 mg daily for 3 days, then 20 mg daily for 3 days, then stop\par - Increase Symbicort to 160-4.5 mcg 2 puffs twice daily, rinse after use \par - Start montelukast 10 mg at bedtime\par - Will monitor for drowsiness, vivid dreams, or change in mood or behavior while on montelukast therapy\par - Plan for PFTs with albuterol testing in June\par - Needs repeat CT chest in end of June to assess for resolution of previously seen bibasilar pneumonia/atelectasis\par - Monitor peak flows, a meter was provided by the ED\par - Will consider checking asthma labs next visit, including CBC with diff, IgE, 25-OH vit D, and serum allergy panel\par - Previous allergy skin testing negative with Dr. Boxer\par - Currently with improved acid reflux, he is taking lansoprazole while on steroids but will stop after\par - Continue fluticasone nasal spray twice daily for chronic rhinitis\par - Follow-up in 1 month

## 2023-05-24 NOTE — ED ADULT NURSE NOTE - OBJECTIVE STATEMENT
Patient is a 41 yo male A&Ox4 PMH asthma, pneumonia 2 mo ago presenting to the ED from home complaining of cough and dyspnea. Patient states the cough began 5-6 days ago, has become increasingly worse, productive with white sputum. Patient states that the dyspnea developed about 2 days ago and has been using his rescue inhaler and duoneb. Patient states that he has bilat flank pain and occurs with coughing. Patient denies fever/chills, chest pain, abd pain, n/v/diarrhea. On exam, pt has bilat crackles in bases, clear bilat apices.
No risk alerts present

## 2023-06-01 ENCOUNTER — NON-APPOINTMENT (OUTPATIENT)
Age: 43
End: 2023-06-01

## 2023-06-13 ENCOUNTER — APPOINTMENT (OUTPATIENT)
Dept: PULMONOLOGY | Facility: CLINIC | Age: 43
End: 2023-06-13
Payer: COMMERCIAL

## 2023-06-13 PROCEDURE — 94726 PLETHYSMOGRAPHY LUNG VOLUMES: CPT

## 2023-06-13 PROCEDURE — 94060 EVALUATION OF WHEEZING: CPT

## 2023-06-13 PROCEDURE — 94729 DIFFUSING CAPACITY: CPT

## 2023-06-22 ENCOUNTER — OUTPATIENT (OUTPATIENT)
Dept: OUTPATIENT SERVICES | Facility: HOSPITAL | Age: 43
LOS: 1 days | End: 2023-06-22
Payer: COMMERCIAL

## 2023-06-22 ENCOUNTER — APPOINTMENT (OUTPATIENT)
Dept: CT IMAGING | Facility: IMAGING CENTER | Age: 43
End: 2023-06-22
Payer: COMMERCIAL

## 2023-06-22 DIAGNOSIS — Z87.01 PERSONAL HISTORY OF PNEUMONIA (RECURRENT): ICD-10-CM

## 2023-06-22 PROCEDURE — 71250 CT THORAX DX C-: CPT

## 2023-06-22 PROCEDURE — 71250 CT THORAX DX C-: CPT | Mod: 26

## 2023-06-23 NOTE — DISCUSSION/SUMMARY
[FreeTextEntry1] : RESULTS TRANSMISSION\par Rafiq Hills is a 42-year-old male who was called 05/04/2023 for a discussion regarding their genetic testing results related to hereditary cancer predisposition. \par \par Mr. Hills was originally seen at [facility] on 04/04/2023 for hereditary cancer predisposition risk assessment. He has no personal history of cancer but he has a family history of male and female breast cancer. Mr. Hills decided to pursue genetic testing using a breast cancer panel (14 genes) offered by [employer]. \par \par TEST RESULTS: NEGATIVE\par \par No pathogenic (disease-causing) variants or VUSs were detected in the following genes: MOON*, BARD1, BRCA1, BRCA2, BRIP1, CDH1, CHEK2, NF1*, PALB2, PTEN*, RAD51C, RAD51D, STK11, TP53\par \par RESULTS INTERPRETATION AND ASSESSMENT:\par Given Mr. Hills’s personal and current reported family history of cancer, his negative genetic test results, and in the absence of other indications, he should practice age-appropriate cancer screening of other organ systems as recommended for the general population.\par \par We also discussed that, while no cause of the patient’s personal and family history of cancer was identified, this result, while reassuring, does entirely not rule out a hereditary cancer risk in the patient. It is possible, although unlikely, the patient has a mutation in one of the genes tested that is not detectable by this analysis, or has a mutation in a different gene, either known or unknown. It is also possible there is a hereditary cancer predisposition in the family, but the patient did not inherit it.\par \par We informed Mr. Hills that our knowledge of genetics and inherited cancer conditions is changing rapidly. Therefore, we recommended that Mr. Hills contact our office, every 2 to 3 years, to discuss relevant advances in cancer genetics.  We emphasized the importance of re-contacting us with updates regarding his personal and family history of cancer as well as any updates regarding additional cancer genetic test results performed for the patient and/or family members.  Such updates could possibly change our risk assessment and recommendations. \par \par PLAN:\par 1.	See above for recommended screening and risk-reduction strategies.\par 2.	Patient informed consult note(s) will be available through their [facility] patient portal and genetic test results will be released via [facility] portal. \par 3.	Mr. Hills was encouraged to contact us every 2-3 years to discuss relevant advances in cancer genetics, or sooner if there are any changes in his personal or family history of cancer.\par \par \par For any additional questions please call [facility] at [phone]. \par \par \par Ivan Mayo, MS, [facility]\par Genetic Counselor, [facility]\par \par \par CC: \par Patient\par Charmaine BEAUCHAMP)

## 2023-07-10 NOTE — REVIEW OF SYSTEMS
70/M with long hx of depression, 1 remote psych hospitalization ([facility], 1989), has no hx of self-injurious behavior/SA and no substance abuse hx.  Pt has multiple medical hx of: [Negative] : Endocrine 70/M with long hx of depression, 1 remote psych hospitalization ([facility], 1989), has no hx of self-injurious behavior/SA and no substance abuse hx.  Pt has multiple medical hx of: GERD, HTN, Hypercholesteremia  and Vitamin D deficiency; s/p right hip replacement.  Today, he presented with worsening depressive symptoms and having suicidal thoughts with plan (wanting to jump off a window).  Depression and SI are being perpetuated by ongoing somatic pains (headache , abdominal pain).  Depressive symptoms have caused significant functional impairment for this Pt such that he is unable to care for himself.  Pt current feels hopeless/helpless.     Apart from the depression, he is also anxious.  Pt is not acutely manic nor psychotic.  However, cannot be safely discharged back to the community as he is a threat to himself (having SI, unable to fully fend for himself - he is at risk for SI: is single, white male who lives alone and has limited social support.  Pt will benefit from in-Pt psych admission for continued stabilization.      RECOMMENDATIONS:   1. suggest Remeron 15mg HS for control of depression, sleep disturbance and appetite stimulant  2. Trazodone 25-50mg HS PRN as off label for early insomnia  3. Ativan 0.5mg TID PRN for anxiety/agitation   4. 1:1 constant   5. dementia work up     - discussed with ED attending, Dr PRIYANKA Monroy re: Pt's headaches; Per Dr Monroy, no indication to do cranial CT at this time. will aim for symptomatic control.   - once Pt is medically cleared, facilitate BH transfer.  team will work towards [facility] bed acquisition as Pt and sister both verbalized preference towards [facility] admission  - will also need to exclude for MCI on top of possible anticholinergic side effects experienced by the Pt 70/M with long hx of depression, 1 remote psych hospitalization ([facility], 1989), has no hx of self-injurious behavior/SA and no substance abuse hx.  Pt has multiple medical hx of: GERD, HTN, Hypercholesteremia  and Vitamin D deficiency; s/p right hip replacement.  Today, he presented with worsening depressive symptoms and having suicidal thoughts with plan (wanting to jump off a window).  Depression and SI are being perpetuated by ongoing somatic pains (headache , abdominal pain).  Depressive symptoms have caused significant functional impairment for this Pt such that he is unable to care for himself.  Pt current feels hopeless/helpless.     Apart from the depression, he is also anxious.  Pt is not acutely manic nor psychotic.  However, cannot be safely discharged back to the community as he is a threat to himself (having SI, unable to fully fend for himself - he is at risk for SI: is single, white male who lives alone and has limited social support.  Pt will benefit from in-Pt psych admission for continued stabilization.      RECOMMENDATIONS:   1. suggest Remeron 15mg HS for control of depression, sleep disturbance and appetite stimulant  2. Trazodone 25mg HS PRN as off label for early insomnia  3. Ativan 0.5mg TID PRN for anxiety/agitation   4. 1:1 constant   5. dementia work up     - discussed with ED attending, Dr PRIYANKA Monroy re: Pt's headaches; Per Dr Monroy, no indication to do cranial CT at this time. will aim for symptomatic control.   - once Pt is medically cleared, facilitate BH transfer.  team will work towards [facility] bed acquisition as Pt and sister both verbalized preference towards [facility] admission  - will also need to exclude for MCI on top of possible anticholinergic side effects experienced by the Pt 70/M with long hx of depression, 1 remote psych hospitalization ([facility], 1989), has no hx of self-injurious behavior/SA and no substance abuse hx.  Pt has multiple medical hx of: GERD, HTN, Hypercholesteremia  and Vitamin D deficiency; s/p right hip replacement.  Today, he presented with worsening depressive symptoms and having suicidal thoughts with plan (wanting to jump off a window).  Depression and SI are being perpetuated by ongoing somatic pains (headache , abdominal pain).  Depressive symptoms have caused significant functional impairment for this Pt such that he is unable to care for himself.  Pt current feels hopeless/helpless.     Apart from the depression, he is also anxious.  Pt is not acutely manic nor psychotic.  However, cannot be safely discharged back to the community as he is a threat to himself (having SI, unable to fully fend for himself - he is at risk for SI: is single, white male who lives alone and has limited social support.  Pt will benefit from in-Pt psych admission for continued stabilization.      RECOMMENDATIONS:   1. suggest Remeron 15mg HS for control of depression, sleep disturbance and appetite stimulant  2. Trazodone 25mg HS PRN as off label for early insomnia  3. Ativan 0.5mg TID PRN for anxiety/agitation   4. 1:1 constant   5. dementia work up     - discussed with ED attending, Dr PRIYANKA Monroy re: Pt's headaches; Per Dr Monroy, no indication to do cranial CT at this time. will aim for symptomatic control.   - once Pt is medically cleared, facilitate BH transfer.  team will work towards [facility] bed acquisition as Pt and sister both verbalized preference towards [facility] admission  - will also need to exclude for MCI on top of possible anticholinergic side effects experienced by the Pt      Attending reassessment this morning on 7/10/19 at 8:30 am : Pt reports he has chronic headaches, and has GI issues which impact his daily life and routine. He has been feeling increasingly depressed and anxious about his medical issues. Patient reports he verbalized not wanting to live at the moment when he stated he wanted to jump out fo the window he really meant it . He feels safe while in the hospital and denies he will hurt himself in this setting. Pt is currently denying any active si/i/p. Pt will not need 1:1 at this time. I reviewed all medical medications, pt brought his medications with him  . Will start Mirtzapin 15mg qhs for depression. 70/M with long hx of depression, 1 remote psych hospitalization ([facility], 1989), has no hx of self-injurious behavior/SA and no substance abuse hx.  Pt has multiple medical hx of: GERD, HTN, Hypercholesteremia  and Vitamin D deficiency; s/p right hip replacement.  Today, he presented with worsening depressive symptoms and having suicidal thoughts with plan (wanting to jump off a window).  Depression and SI are being perpetuated by ongoing somatic pains (headache , abdominal pain).  Depressive symptoms have caused significant functional impairment for this Pt such that he is unable to care for himself.  Pt current feels hopeless/helpless.     Apart from the depression, he is also anxious.  Pt is not acutely manic nor psychotic.  However, cannot be safely discharged back to the community as he is a threat to himself (having SI, unable to fully fend for himself - he is at risk for SI: is single, white male who lives alone and has limited social support.  Pt will benefit from in-Pt psych admission for continued stabilization.      RECOMMENDATIONS:   1. suggest Remeron 15mg HS for control of depression, sleep disturbance and appetite stimulant  2. Trazodone 25mg HS PRN as off label for early insomnia  3. Ativan 0.5mg TID PRN for anxiety/agitation   4. 1:1 constant   5. dementia work up     - discussed with ED attending, Dr PRIYANKA Monroy re: Pt's headaches; Per Dr Monroy, no indication to do cranial CT at this time. will aim for symptomatic control.   - once Pt is medically cleared, facilitate BH transfer.  team will work towards [facility] bed acquisition as Pt and sister both verbalized preference towards [facility] admission  - will also need to exclude for MCI on top of possible anticholinergic side effects experienced by the Pt      Attending reassessment this morning on 7/10/19 at 8:30 am : Pt reports he has chronic headaches, and has GI issues which impact his daily life and routine. He has been feeling increasingly depressed and anxious about his medical issues. Patient reports he verbalized not wanting to live at the moment when he stated he wanted to jump out fo the window he really meant it . He feels safe while in the hospital and denies he will hurt himself in this setting. Pt is currently denying any active si/i/p. Pt will not need 1:1 at this time. I reviewed all medical medications, pt brought his medications with him  . Will start Mirtazapine 15mg qhs for depression. Patient is admitted to [facility] .,  left for Dr. Ortiz

## 2023-07-11 ENCOUNTER — APPOINTMENT (OUTPATIENT)
Dept: PULMONOLOGY | Facility: CLINIC | Age: 43
End: 2023-07-11
Payer: COMMERCIAL

## 2023-07-11 VITALS
HEART RATE: 89 BPM | HEIGHT: 69 IN | DIASTOLIC BLOOD PRESSURE: 80 MMHG | SYSTOLIC BLOOD PRESSURE: 134 MMHG | OXYGEN SATURATION: 96 % | RESPIRATION RATE: 17 BRPM | BODY MASS INDEX: 27.25 KG/M2 | WEIGHT: 184 LBS

## 2023-07-11 DIAGNOSIS — Z91.89 OTHER SPECIFIED PERSONAL RISK FACTORS, NOT ELSEWHERE CLASSIFIED: ICD-10-CM

## 2023-07-11 PROCEDURE — 99214 OFFICE O/P EST MOD 30 MIN: CPT

## 2023-07-11 RX ORDER — IPRATROPIUM BROMIDE 0.5 MG/2.5ML
0.02 SOLUTION RESPIRATORY (INHALATION) 4 TIMES DAILY
Qty: 2 | Refills: 3 | Status: COMPLETED | COMMUNITY
Start: 2023-05-18 | End: 2023-07-11

## 2023-07-11 RX ORDER — FAMOTIDINE 20 MG/1
20 TABLET, FILM COATED ORAL
Qty: 60 | Refills: 3 | Status: COMPLETED | COMMUNITY
Start: 2023-03-23 | End: 2023-07-11

## 2023-07-11 RX ORDER — OMEPRAZOLE 40 MG/1
40 CAPSULE, DELAYED RELEASE ORAL
Qty: 30 | Refills: 1 | Status: COMPLETED | COMMUNITY
Start: 2023-03-30 | End: 2023-07-11

## 2023-07-11 RX ORDER — FLUTICASONE PROPIONATE 50 UG/1
50 SPRAY, METERED NASAL DAILY
Qty: 1 | Refills: 11 | Status: ACTIVE | COMMUNITY
Start: 2022-03-15 | End: 1900-01-01

## 2023-07-11 RX ORDER — PREDNISONE 20 MG/1
20 TABLET ORAL
Qty: 9 | Refills: 0 | Status: COMPLETED | COMMUNITY
Start: 2023-05-16 | End: 2023-07-11

## 2023-07-11 NOTE — PHYSICAL EXAM
[No Acute Distress] : no acute distress [Well Nourished] : well nourished [Well Developed] : well developed [Normal Oropharynx] : normal oropharynx [Normal Appearance] : normal appearance [Supple] : supple [No Neck Mass] : no neck mass [No JVD] : no jvd [Normal Rate/Rhythm] : normal rate/rhythm [Normal Pulses] : normal pulses [Normal S1, S2] : normal s1, s2 [No Murmurs] : no murmurs [No Resp Distress] : no resp distress [No Acc Muscle Use] : no acc muscle use [No Abnormalities] : no abnormalities [Benign] : benign [Not Tender] : not tender [Normal Gait] : normal gait [No Clubbing] : no clubbing [No Cyanosis] : no cyanosis [No Edema] : no edema [FROM] : FROM [Normal Color/ Pigmentation] : normal color/ pigmentation [No Focal Deficits] : no focal deficits [Cranial Nerves Intact] : cranial nerves intact [Oriented x3] : oriented x3 [Normal Affect] : normal affect [I] : Mallampati Class: I [Clear to Auscultation Bilaterally] : clear to auscultation bilaterally [TextBox_11] : tonsils appear mildly enlarged [TextBox_68] : no wheezing

## 2023-07-11 NOTE — ASSESSMENT
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 He was recently in the ED in May 2023 for cough, wheezing, and dyspnea due to Parainfluenza viral URI with asthma exacerbation requiring prednisone taper. He now takes Symbicort 160-4.5 mcg 1 puff twice daily and Montelukast 10 mg at bedtime. He did not tolerate 2 puffs of Symbicort because of myalgias and muscle cramps. He reports significantly improved dyspnea on exertion and exercise tolerance. He had repeat CT chest in June, which showed resolved bilateral lower lobe pneumonia/atelectasis. He continues to take fluticasone nasal spray prn for allergic rhinitis with worsening nasal congestion. He reports mild epistaxis for which he takes nasal saline drops. ROS positive for snoring and sinus congestion. He snores loudly and wakes up girlfriend. Snoring is worse when supine, improves with sleeping on his side or prone. No significant morning headache. Wakes up unrefreshed with nonrestorative sleep. Wakes up with sore mouth that is mild. He feels tired throughout the day. Does not fall asleep easily during the day if doing light activity. Reports excessive daytime sleepiness. He was previously seen by ENT and found to have no significant anatomic obstruction, but does have nasal septal deviation.\par \par He was previously hospitalized in March 2023 with GUZMAN, cough with sputum production, and bibasilar pneumonia/atelectasis on CT Chest. cardiac catheterization which was negative.hospitalization for dyspnea on exertion, cough with sputum production, and bibasilar pneumonia/atelectasis on CT chest. Due to his dyspnea on exertion, he had an extensive cardiac workup that was unremarkable, including echo, nuclear stress test, and cardiac cath. Troponin was initially elevated to 150s, improved to 120s. Noted to have low RAP and PCWP on cardiac cath, suggesting dehydration. Given persistent cough and sputum production, he was started on Augmentin and Z-pack, and also completed a medrol dose pack.\par \par CT Chest June 2023 with clear lungs, patent central airways, and interval resolution of bilateral lower lobe opacities. No pleural effusion. No thoracic lymphadenopathy. \par \par PFTs in June 2023 with normal spirometry, normal lung volumes, and normal diffusing capacity. No BD response.\par \par Assessment:\par Moderate persistent asthma\par Allergic rhinitis\par History of bibasilar pneumonia\par DELBERT symptomatology\par \par Plan:\par - Change Symbicort to 80-4.5 mcg 2 puffs twice daily, rinse after use\par - If asthma symptoms remain well controlled for the next 3 months, will decrease to Symbicort 80-4.5 mcg 1 puffs twice daily, rinse after use\par - Continue montelukast 10 mg at bedtime, consider tapering after initially tapering ICS-LABA if asthma remains well controlled\par - Albuterol inhaler prn (has used only once in the last month)\par - Also has Combivent inhaler and Duonebs at home that he has not required to use recently\par - Continue fluticasone nasal spray 1 spray per nostril twice daily\par - Nasal saline drops q4-6h prn for epistaxis/nasal dryness\par - ENT follow-up with Dr. Motley, consider tonsillectomy given snoring and mildly enlarged tonsils on exam\par - Given DELBERT symptomatology, will obtain diagnostic PSG/CPAP titration in-lab\par - Recent PFTs in June 2023 with normal spirometry, lung volumes, and diffusing capacity. No BD response\par - CT chest in June 2023 reviewed, resolution of previously seen bibasilar pneumonia\par - Given controlled asthma symptoms currently, will hold off on checking asthma labs at this time. Consider obtaining CBC with diff, IgE, 25-OH vit D, and serum allergy panel if symptoms worsen in the future\par - Previous allergy skin testing negative with Dr. Boxer\par - Follow-up in 3 months or after sleep study

## 2023-07-11 NOTE — HISTORY OF PRESENT ILLNESS
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 He was recently in the ED for cough, wheezing, and dyspnea due to Parainfluenza viral URI with asthma exacerbation requiring prednisone taper. He now takes Symbicort 160-4.5 mcg 1 puff twice daily and Montelukast 10 mg at bedtime. He did not tolerate 2 puffs of Symbicort because of myalgias and muscle cramps. He reports significantly improved dyspnea on exertion and exercise tolerance. He had repeat CT chest in June, which showed resolved bilateral lower lobe pneumonia/atelectasis. He continues to take fluticasone nasal spray prn for allergic rhinitis with worsening nasal congestion. He reports mild epistaxis for which he takes nasal saline drops.  \par \par ROS positive for snoring and sinus congestion. He snores loudly and wakes up girlfriend. Snoring is worse when supine, improves with sleeping on his side or prone. No significant morning headache. Wakes up unrefreshed with nonrestorative sleep. Wakes up with sore mouth that is mild. He feels tired throughout the day. Does not fall asleep easily during the day if doing light activity. Reports excessive daytime sleepiness. He was previously seen by ENT and found to have no significant anatomic obstruction, but does have nasal septal deviation.\par \par He was previously hospitalized in March 2023 with GUZMAN, cough with sputum production, and bibasilar pneumonia/atelectasis on CT Chest. cardiac catheterization which was negative.hospitalization for dyspnea on exertion, cough with sputum production, and bibasilar pneumonia/atelectasis on CT chest. Due to his dyspnea on exertion, he had an extensive cardiac workup that was unremarkable, including echo, nuclear stress test, and cardiac cath. Troponin was initially elevated to 150s, improved to 120s. Noted to have low RAP and PCWP on cardiac cath, suggesting dehydration. Given persistent cough and sputum production, he was started on Augmentin and Z-pack, and also completed a medrol dose pack.\par \par He reports resolved acid reflux. He is not currently taking PPI or famotidine at this time. For his upper airway cough syndrome, he takes fluticasone nasal spray. He used to exercise regularly, recently started again with improved exercise tolerance.\par \par PFTs in June 2023 with normal spirometry, normal lung volumes, and normal diffusing capacity. There was no bronchodilator response. No change compared to prior PFTs.

## 2023-07-12 ENCOUNTER — FORM ENCOUNTER (OUTPATIENT)
Age: 43
End: 2023-07-12

## 2023-07-13 ENCOUNTER — APPOINTMENT (OUTPATIENT)
Dept: SLEEP CENTER | Facility: CLINIC | Age: 43
End: 2023-07-13
Payer: COMMERCIAL

## 2023-07-13 ENCOUNTER — OUTPATIENT (OUTPATIENT)
Dept: OUTPATIENT SERVICES | Facility: HOSPITAL | Age: 43
LOS: 1 days | End: 2023-07-13
Payer: COMMERCIAL

## 2023-07-13 PROCEDURE — 95811 POLYSOM 6/>YRS CPAP 4/> PARM: CPT | Mod: 26

## 2023-07-13 PROCEDURE — 95811 POLYSOM 6/>YRS CPAP 4/> PARM: CPT

## 2023-07-14 DIAGNOSIS — G47.33 OBSTRUCTIVE SLEEP APNEA (ADULT) (PEDIATRIC): ICD-10-CM

## 2023-08-03 NOTE — PHYSICAL EXAM
[No Acute Distress] : no acute distress [Normal Rate/Rhythm] : normal rate/rhythm [Normal S1, S2] : normal s1, s2 [No Resp Distress] : no resp distress [Clear to Auscultation Bilaterally] : clear to auscultation bilaterally [Oriented x3] : oriented x3 IMPROVE-DD Application Not Available

## 2023-08-08 ENCOUNTER — NON-APPOINTMENT (OUTPATIENT)
Age: 43
End: 2023-08-08

## 2023-08-08 ENCOUNTER — APPOINTMENT (OUTPATIENT)
Dept: INTERNAL MEDICINE | Facility: CLINIC | Age: 43
End: 2023-08-08
Payer: COMMERCIAL

## 2023-08-08 VITALS
HEIGHT: 69 IN | HEART RATE: 88 BPM | BODY MASS INDEX: 26.36 KG/M2 | DIASTOLIC BLOOD PRESSURE: 82 MMHG | SYSTOLIC BLOOD PRESSURE: 127 MMHG | WEIGHT: 178 LBS

## 2023-08-08 DIAGNOSIS — Z80.3 FAMILY HISTORY OF MALIGNANT NEOPLASM OF BREAST: ICD-10-CM

## 2023-08-08 DIAGNOSIS — Z80.8 FAMILY HISTORY OF MALIGNANT NEOPLASM OF OTHER ORGANS OR SYSTEMS: ICD-10-CM

## 2023-08-08 DIAGNOSIS — Z83.49 FAMILY HISTORY OF OTHER ENDOCRINE, NUTRITIONAL AND METABOLIC DISEASES: ICD-10-CM

## 2023-08-08 DIAGNOSIS — Z00.00 ENCOUNTER FOR GENERAL ADULT MEDICAL EXAMINATION W/OUT ABNORMAL FINDINGS: ICD-10-CM

## 2023-08-08 PROCEDURE — 36415 COLL VENOUS BLD VENIPUNCTURE: CPT

## 2023-08-08 PROCEDURE — 99386 PREV VISIT NEW AGE 40-64: CPT | Mod: 25

## 2023-08-08 RX ORDER — BUDESONIDE AND FORMOTEROL FUMARATE DIHYDRATE 80; 4.5 UG/1; UG/1
80-4.5 AEROSOL RESPIRATORY (INHALATION) TWICE DAILY
Qty: 1 | Refills: 5 | Status: ACTIVE | COMMUNITY
Start: 2023-02-03 | End: 1900-01-01

## 2023-08-08 NOTE — HEALTH RISK ASSESSMENT
[Good] : ~his/her~  mood as  good [No] : No [No falls in past year] : Patient reported no falls in the past year [0] : 2) Feeling down, depressed, or hopeless: Not at all (0) [PHQ-2 Negative - No further assessment needed] : PHQ-2 Negative - No further assessment needed [Never] : Never [[id number]] : 0 [Change in mental status noted] : No change in mental status noted [Significant Other] : lives with significant other [Fully functional (bathing, dressing, toileting, transferring, walking, feeding)] : Fully functional (bathing, dressing, toileting, transferring, walking, feeding) [Fully functional (using the telephone, shopping, preparing meals, housekeeping, doing laundry, using] : Fully functional and needs no help or supervision to perform IADLs (using the telephone, shopping, preparing meals, housekeeping, doing laundry, using transportation, managing medications and managing finances) [Reports changes in hearing] : Reports no changes in hearing [Reports changes in vision] : Reports no changes in vision

## 2023-08-08 NOTE — HISTORY OF PRESENT ILLNESS
[de-identified] : 42 year old male presents for initial annual exam.  Recently diagnosed with asthma following COVID infection back in 2020.  has symptoms related to long covid.  States has been doing better after starting symbicort.  follows with pulmonologist   Was hospitalized earlier this year due to acute exacerbation which led to elevated troponins, normal cardiac catheterization.    h/o microscopic hematuria, seen by urology in the past.  normal renal US in the past  No cystoscopy performed at that time.  at times feels as his testicle is going upward to his pelvis region.  no swelling or reddness.  no dysuria or mass appreciated.   diet- home cooking mostly.  more proteins, carbs.  occasional soda.  no  jucies or energy drinks. exercise- denies   significant other, no children  employed- MD  non-smoker

## 2023-08-08 NOTE — ASSESSMENT
[FreeTextEntry1] : 42 year old male presents for initial annual exam.  Asthma,  Recently diagnosed with asthma following COVID infection back in 2020.  has symptoms related to long covid.  States has been doing better after starting symbicort.  follows with pulmonologist  Was hospitalized earlier this year due to acute exacerbation which led to elevated troponins, normal cardiac catheterization.   -cont symbicort as directed  -Follow-up as per pulmonology  Microscopic hematuria, seen by urology in the past.  normal renal US in the past  No cystoscopy performed at that time.  at times feels as his testicle is going upward to his pelvis region.  no swelling or reddness.  no dysuria or mass appreciated.  -uro referral   Healthcare Maintenance -Advise Yearly Skin cancer screening with Dermatologist  -Advise Yearly Eye exam with Ophthalmologist -Advise Yearly Dental exam -Educated of the importance of Healthy diet, such as Mediterranean Diet and Exercise, such as walking >20 minutes a day and increasing gradually as tolerated  Immunizations -Flu vaccine  -Covid vaccine   Preventative screening  -advised to get colonoscopy for colon cancer screening -start to screen at 47 y/o  -will check PSA for prostate cancer screening -up to date

## 2023-08-09 ENCOUNTER — CLINICAL ADVICE (OUTPATIENT)
Age: 43
End: 2023-08-09

## 2023-08-09 LAB
ALBUMIN SERPL ELPH-MCNC: 4.7 G/DL
ALP BLD-CCNC: 80 U/L
ALT SERPL-CCNC: 15 U/L
ANION GAP SERPL CALC-SCNC: 15 MMOL/L
APPEARANCE: CLEAR
AST SERPL-CCNC: 16 U/L
BACTERIA: NEGATIVE /HPF
BILIRUB DIRECT SERPL-MCNC: 0.1 MG/DL
BILIRUB INDIRECT SERPL-MCNC: 0.2 MG/DL
BILIRUB SERPL-MCNC: 0.4 MG/DL
BILIRUBIN URINE: NEGATIVE
BLOOD URINE: ABNORMAL
BUN SERPL-MCNC: 20 MG/DL
C TRACH RRNA SPEC QL NAA+PROBE: NOT DETECTED
CALCIUM SERPL-MCNC: 9.7 MG/DL
CAST: 0 /LPF
CHLORIDE SERPL-SCNC: 104 MMOL/L
CHOLEST SERPL-MCNC: 202 MG/DL
CO2 SERPL-SCNC: 21 MMOL/L
COLOR: YELLOW
CREAT SERPL-MCNC: 0.92 MG/DL
EGFR: 107 ML/MIN/1.73M2
EPITHELIAL CELLS: 0 /HPF
ESTIMATED AVERAGE GLUCOSE: 111 MG/DL
FERRITIN SERPL-MCNC: 164 NG/ML
FOLATE SERPL-MCNC: 11.2 NG/ML
GLUCOSE QUALITATIVE U: NEGATIVE MG/DL
GLUCOSE SERPL-MCNC: 95 MG/DL
HBA1C MFR BLD HPLC: 5.5 %
HBV SURFACE AB SER QL: REACTIVE
HBV SURFACE AG SER QL: NONREACTIVE
HCV AB SER QL: NONREACTIVE
HCV S/CO RATIO: 0.06 S/CO
HDLC SERPL-MCNC: 42 MG/DL
HIV1+2 AB SPEC QL IA.RAPID: NONREACTIVE
IRON SATN MFR SERPL: 28 %
IRON SERPL-MCNC: 89 UG/DL
KETONES URINE: NEGATIVE MG/DL
LDLC SERPL CALC-MCNC: 139 MG/DL
LEUKOCYTE ESTERASE URINE: NEGATIVE
MICROSCOPIC-UA: NORMAL
N GONORRHOEA RRNA SPEC QL NAA+PROBE: NOT DETECTED
NITRITE URINE: NEGATIVE
NONHDLC SERPL-MCNC: 160 MG/DL
PH URINE: 6
POTASSIUM SERPL-SCNC: 3.9 MMOL/L
PROT SERPL-MCNC: 7.3 G/DL
PROTEIN URINE: NEGATIVE MG/DL
PSA SERPL-MCNC: 0.33 NG/ML
RED BLOOD CELLS URINE: 4 /HPF
SODIUM SERPL-SCNC: 140 MMOL/L
SOURCE AMPLIFICATION: NORMAL
SPECIFIC GRAVITY URINE: 1.03
T PALLIDUM AB SER QL IA: NEGATIVE
TIBC SERPL-MCNC: 319 UG/DL
TRIGL SERPL-MCNC: 117 MG/DL
TSH SERPL-ACNC: 1.74 UIU/ML
UIBC SERPL-MCNC: 230 UG/DL
UROBILINOGEN URINE: 0.2 MG/DL
VIT B12 SERPL-MCNC: 283 PG/ML
WHITE BLOOD CELLS URINE: 0 /HPF

## 2023-09-28 ENCOUNTER — APPOINTMENT (OUTPATIENT)
Dept: OTOLARYNGOLOGY | Facility: CLINIC | Age: 43
End: 2023-09-28
Payer: COMMERCIAL

## 2023-09-28 VITALS
BODY MASS INDEX: 26.66 KG/M2 | SYSTOLIC BLOOD PRESSURE: 144 MMHG | HEIGHT: 69 IN | DIASTOLIC BLOOD PRESSURE: 76 MMHG | WEIGHT: 180 LBS | HEART RATE: 70 BPM

## 2023-09-28 DIAGNOSIS — J32.9 CHRONIC SINUSITIS, UNSPECIFIED: ICD-10-CM

## 2023-09-28 DIAGNOSIS — H93.19 TINNITUS, UNSPECIFIED EAR: ICD-10-CM

## 2023-09-28 PROCEDURE — 92557 COMPREHENSIVE HEARING TEST: CPT

## 2023-09-28 PROCEDURE — 31231 NASAL ENDOSCOPY DX: CPT

## 2023-09-28 PROCEDURE — 92567 TYMPANOMETRY: CPT

## 2023-09-28 PROCEDURE — 99214 OFFICE O/P EST MOD 30 MIN: CPT | Mod: 25

## 2023-09-28 RX ORDER — MOMETASONE FUROATE 100 %
POWDER (GRAM) MISCELLANEOUS
Qty: 1 | Refills: 3 | Status: ACTIVE | COMMUNITY
Start: 2023-09-28 | End: 1900-01-01

## 2023-10-02 PROBLEM — H93.19 TINNITUS: Status: ACTIVE | Noted: 2023-10-02

## 2023-10-24 ENCOUNTER — APPOINTMENT (OUTPATIENT)
Dept: UROLOGY | Facility: CLINIC | Age: 43
End: 2023-10-24
Payer: COMMERCIAL

## 2023-10-24 VITALS — HEART RATE: 80 BPM | SYSTOLIC BLOOD PRESSURE: 129 MMHG | DIASTOLIC BLOOD PRESSURE: 88 MMHG

## 2023-10-24 DIAGNOSIS — R31.29 OTHER MICROSCOPIC HEMATURIA: ICD-10-CM

## 2023-10-24 DIAGNOSIS — Q55.22 RETRACTILE TESTIS: ICD-10-CM

## 2023-10-24 PROCEDURE — 99203 OFFICE O/P NEW LOW 30 MIN: CPT

## 2023-10-26 ENCOUNTER — APPOINTMENT (OUTPATIENT)
Dept: PULMONOLOGY | Facility: CLINIC | Age: 43
End: 2023-10-26
Payer: COMMERCIAL

## 2023-10-26 VITALS
DIASTOLIC BLOOD PRESSURE: 78 MMHG | RESPIRATION RATE: 15 BRPM | OXYGEN SATURATION: 95 % | WEIGHT: 175 LBS | HEART RATE: 95 BPM | BODY MASS INDEX: 25.92 KG/M2 | HEIGHT: 69 IN | SYSTOLIC BLOOD PRESSURE: 121 MMHG | TEMPERATURE: 98.4 F

## 2023-10-26 PROCEDURE — 99214 OFFICE O/P EST MOD 30 MIN: CPT

## 2023-10-26 RX ORDER — IPRATROPIUM BROMIDE AND ALBUTEROL 20; 100 UG/1; UG/1
20-100 SPRAY, METERED RESPIRATORY (INHALATION) 4 TIMES DAILY
Qty: 1 | Refills: 3 | Status: COMPLETED | COMMUNITY
Start: 2023-03-22 | End: 2023-10-26

## 2023-10-26 RX ORDER — AZELASTINE HYDROCHLORIDE 137 UG/1
137 SPRAY, METERED NASAL
Qty: 1 | Refills: 7 | Status: ACTIVE | COMMUNITY
Start: 2023-09-28 | End: 1900-01-01

## 2023-10-26 RX ORDER — MONTELUKAST 10 MG/1
10 TABLET, FILM COATED ORAL
Qty: 1 | Refills: 3 | Status: DISCONTINUED | COMMUNITY
Start: 2023-05-23 | End: 2023-10-26

## 2023-11-30 ENCOUNTER — TRANSCRIPTION ENCOUNTER (OUTPATIENT)
Age: 43
End: 2023-11-30

## 2023-12-22 ENCOUNTER — EMERGENCY (EMERGENCY)
Facility: HOSPITAL | Age: 43
LOS: 1 days | Discharge: HOME | End: 2023-12-22
Attending: STUDENT IN AN ORGANIZED HEALTH CARE EDUCATION/TRAINING PROGRAM
Payer: COMMERCIAL

## 2023-12-22 VITALS
TEMPERATURE: 98 F | DIASTOLIC BLOOD PRESSURE: 71 MMHG | RESPIRATION RATE: 20 BRPM | HEIGHT: 69 IN | OXYGEN SATURATION: 98 % | HEART RATE: 89 BPM | SYSTOLIC BLOOD PRESSURE: 133 MMHG | WEIGHT: 179.9 LBS

## 2023-12-22 VITALS
RESPIRATION RATE: 18 BRPM | TEMPERATURE: 98 F | HEART RATE: 88 BPM | OXYGEN SATURATION: 97 % | SYSTOLIC BLOOD PRESSURE: 136 MMHG | DIASTOLIC BLOOD PRESSURE: 88 MMHG

## 2023-12-22 PROCEDURE — 73130 X-RAY EXAM OF HAND: CPT | Mod: 26,LT

## 2023-12-22 PROCEDURE — 99284 EMERGENCY DEPT VISIT MOD MDM: CPT | Mod: 25

## 2023-12-22 PROCEDURE — 99284 EMERGENCY DEPT VISIT MOD MDM: CPT

## 2023-12-22 PROCEDURE — 90471 IMMUNIZATION ADMIN: CPT

## 2023-12-22 PROCEDURE — 90715 TDAP VACCINE 7 YRS/> IM: CPT

## 2023-12-22 PROCEDURE — 73130 X-RAY EXAM OF HAND: CPT

## 2023-12-22 RX ORDER — IBUPROFEN 200 MG
400 TABLET ORAL ONCE
Refills: 0 | Status: COMPLETED | OUTPATIENT
Start: 2023-12-22 | End: 2023-12-22

## 2023-12-22 RX ORDER — TETANUS TOXOID, REDUCED DIPHTHERIA TOXOID AND ACELLULAR PERTUSSIS VACCINE, ADSORBED 5; 2.5; 8; 8; 2.5 [IU]/.5ML; [IU]/.5ML; UG/.5ML; UG/.5ML; UG/.5ML
0.5 SUSPENSION INTRAMUSCULAR ONCE
Refills: 0 | Status: COMPLETED | OUTPATIENT
Start: 2023-12-22 | End: 2023-12-22

## 2023-12-22 RX ADMIN — Medication 400 MILLIGRAM(S): at 09:43

## 2023-12-22 RX ADMIN — TETANUS TOXOID, REDUCED DIPHTHERIA TOXOID AND ACELLULAR PERTUSSIS VACCINE, ADSORBED 0.5 MILLILITER(S): 5; 2.5; 8; 8; 2.5 SUSPENSION INTRAMUSCULAR at 09:44

## 2023-12-22 NOTE — ED ADULT NURSE NOTE - NSFALLRISKINTERV_ED_ALL_ED
Communicate fall risk and risk factors to all staff, patient, and family/Provide visual cue: yellow wristband, yellow gown, etc/Reinforce activity limits and safety measures with patient and family/Call bell, personal items and telephone in reach/Instruct patient to call for assistance before getting out of bed/chair/stretcher/Non-slip footwear applied when patient is off stretcher/[place] to call system/Physically safe environment - no spills, clutter or unnecessary equipment/Purposeful Proactive Rounding/Room/bathroom lighting operational, light cord in reach Communicate fall risk and risk factors to all staff, patient, and family/Provide visual cue: yellow wristband, yellow gown, etc/Reinforce activity limits and safety measures with patient and family/Call bell, personal items and telephone in reach/Instruct patient to call for assistance before getting out of bed/chair/stretcher/Non-slip footwear applied when patient is off stretcher/[place] to call system/Physically safe environment - no spills, clutter or unnecessary equipment/Purposeful Proactive Rounding/Room/bathroom lighting operational, light cord in reach

## 2023-12-22 NOTE — ED PROVIDER NOTE - ATTENDING CONTRIBUTION TO CARE
History and physical as documented above.  Likely superficial injuries. No role for laceration repair. Will obtain XR, pain control, symptom management, TBDC.

## 2023-12-22 NOTE — ED ADULT NURSE NOTE - OBJECTIVE STATEMENT
Pt is a 42y M, A&O4, PMI of asthma, presents to ED for L hand lacerations. Pt stated last night while running tripped, falling forward on his left hand. Left hand made contact with metal mesh on ground upon landing. No LOC, no head trauma. Lacerations noted to distal nail bed region of the 3rd, 4th, and 5th digits.  Bleeding is controlled, endorsing pain as 3/10 at this time. Sensations intact and motor movement is normal. Breathing is spontaneous and unlabored, skin is warm and dry. Denies numbness, tingling, fevers, chill, dizziness, SOB, chest pain. Pt is well appearing, ambulatory at bedside, speaking full sentences, VSS, no acute distress at this time.

## 2023-12-22 NOTE — ED PROVIDER NOTE - NS ED ROS FT
CONSTITUTIONAL: No fever or chill  HEENT: Denies changes in vision and hearing.  CV: Deneis CP.   MSK:  finger pain  NEUROLOGICAL: Denies headache and syncope.

## 2023-12-22 NOTE — ED PROVIDER NOTE - PHYSICAL EXAMINATION
GENERAL: Alert. No acute distress.   EYES: EOMI grossly normal. Anicteric.   HENT: Moist mucous membranes.   RESP: No conversation dyspnea, no resp distress  MSK: ROM grossly normal in all 4 extremities. No deformities.  left third fourth fifth digit with injury to upper nail.  No injury to nail bed.  SKIN: warm and dry  NEUROLOGIC: Alert and oriented x3  PSYCHIATRIC: Cooperative. Appropriate mood and affect

## 2023-12-22 NOTE — ED PROVIDER NOTE - MDM ORDERS SUBMITTED SELECTION
You can access the FollowMyHealth Patient Portal offered by [facility] by registering at the following website: [email or web address]. By joining [employer]’s FollowMyHealth portal, you will also be able to view your health information using other applications (apps) compatible with our system. Imaging Studies/Medications

## 2023-12-22 NOTE — ED PROVIDER NOTE - PATIENT PORTAL LINK FT
You can access the FollowMyHealth Patient Portal offered by [facility] by registering at the following website: [email or web address]. By joining [employer]’s FollowMyHealth portal, you will also be able to view your health information using other applications (apps) compatible with our system. You can access the FollowMyHealth Patient Portal offered by [facility] by registering at the following website: [email or web address]. By joining [employer]’s FollowMyHealth portal, you will also be able to view your health information using other applications (apps) compatible with our system.

## 2023-12-22 NOTE — ED ADULT NURSE NOTE - CHIEF COMPLAINT QUOTE
L 3rd, 4th and 5th digit injury s/p mechanical fall last night  denies LOC, head injury or blood thinner use

## 2023-12-22 NOTE — ED PROVIDER NOTE - NSFOLLOWUPINSTRUCTIONS_ED_ALL_ED_FT
Your diagnosis:    Discharge instructions:    - Please return to the ED to have the sutures removed in ____ days.    - Tylenol up to 650 mg every 8 hours as needed for pain and/or Motrin up to 600 mg  every 6 hours as needed for pain. Take any prescribed medications as instructed:     - Others: Keep the wound clean and dry for the first 12 hours. Afterwards, you may clean the wound with mild soap and copious water 2 times a day. Dry completely and then apply a thin layer of Bacitracin over the wound.    - Be sure to return to the ED if you develop new or worsening symptoms. Specific signs and symptoms to be vigilant of: fever or chills, redness of wound, increased pain at wound site, swelling at the wound site, pus or drainage at the wound site, bleeding from the wound that does not stop, change in color of the wound such as blue or white, numbness or tingling around wound site. Please return to the ER for unremitting pain or signs of infection.

## 2023-12-22 NOTE — ED PROVIDER NOTE - CLINICAL SUMMARY MEDICAL DECISION MAKING FREE TEXT BOX
41 yo M, no PmHx, here for finger injury after mechanical fall.   Will give pain medication, imaging.   Will clean of wound for better examination after imaging.  however, suspect no fracture.   Will likely discharge home with basic wound care

## 2024-03-11 ENCOUNTER — APPOINTMENT (OUTPATIENT)
Dept: PULMONOLOGY | Facility: CLINIC | Age: 44
End: 2024-03-11
Payer: COMMERCIAL

## 2024-03-11 VITALS
OXYGEN SATURATION: 96 % | SYSTOLIC BLOOD PRESSURE: 146 MMHG | HEIGHT: 68.6 IN | TEMPERATURE: 98.2 F | RESPIRATION RATE: 16 BRPM | WEIGHT: 180 LBS | BODY MASS INDEX: 26.97 KG/M2 | HEART RATE: 110 BPM | DIASTOLIC BLOOD PRESSURE: 87 MMHG

## 2024-03-11 DIAGNOSIS — J45.909 UNSPECIFIED ASTHMA, UNCOMPLICATED: ICD-10-CM

## 2024-03-11 DIAGNOSIS — G47.8 OTHER SLEEP DISORDERS: ICD-10-CM

## 2024-03-11 DIAGNOSIS — J30.9 ALLERGIC RHINITIS, UNSPECIFIED: ICD-10-CM

## 2024-03-11 PROCEDURE — 99214 OFFICE O/P EST MOD 30 MIN: CPT

## 2024-03-11 NOTE — PHYSICAL EXAM
[No Acute Distress] : no acute distress [Well Nourished] : well nourished [Well Developed] : well developed [Normal Appearance] : normal appearance [No Neck Mass] : no neck mass [Normal Rate/Rhythm] : normal rate/rhythm [Normal Pulses] : normal pulses [Normal S1, S2] : normal s1, s2 [No Murmurs] : no murmurs [No Resp Distress] : no resp distress [Clear to Auscultation Bilaterally] : clear to auscultation bilaterally [No Acc Muscle Use] : no acc muscle use [No Abnormalities] : no abnormalities [Benign] : benign [Normal Gait] : normal gait [Not Tender] : not tender [No Cyanosis] : no cyanosis [No Clubbing] : no clubbing [No Edema] : no edema [FROM] : FROM [No Focal Deficits] : no focal deficits [Normal Color/ Pigmentation] : normal color/ pigmentation [Oriented x3] : oriented x3 [Normal Affect] : normal affect [TextBox_68] : no wheezing or rales

## 2024-03-11 NOTE — ASSESSMENT
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 He reports feeling well overall. He is adherent with Symbicort 80-4.5 mcg 1 puff twice daily, rinses after use. He develops muscle cramps with 2 puffs twice daily. He remains off montelukast. He reports dyspnea after going up 2 flights of stairs. He recently started boxing, but still develops dyspnea with activity. Also reports leg cramps, possibly due to Symbicort. Cramps also develop when he takes albuterol. Denies any wheezing, chest tightness, dyspnea, or coughing. He continues to use azelastine nasal spray PRN, but has not used mometasone with saline rinse. He previously tried Flonase without significant improvement. Pending follow-up with ENT Dr. Motley.  CT Chest June 2023 with clear lungs, patent central airways, and interval resolution of bilateral lower lobe opacities. No pleural effusion. No thoracic lymphadenopathy.  PFTs in June 2023 with normal spirometry, normal lung volumes, and normal diffusing capacity. No BD response.  1. Moderate persistent asthma: - Given associated cramps with ICS-LABA therapy (most likely due to beta agonist effect), will switch to Pulmicort Flexhaler 90 mcg 1 inhalation twice daily, rinse after use - Continue Symbicort 80-4.5 mcg 1 puff twice daily PRN or Albuterol inhaler 1-2 puffs q6h PRN - Keep off montelukast therapy at this time given improved asthma symptoms - Overall with improved asthma symptoms with residual dyspnea on exertion, possible exercise induced asthma vs. deconditioning - Given controlled asthma symptoms currently, will hold off on checking asthma labs at this time. Consider obtaining CBC with diff, IgE, 25-OH vit D, and serum allergy panel if symptoms worsen in the future - Previous allergy skin testing negative with Dr. Boxer  2. Allergic rhinitis: - Reports no significant relief with fluticasone nasal spray or mometasone with saline rinse - Consider trial of alternative nasal steroid, such as Nasonex or Rhinocort 1-2 sprays per nostril twice daily - Continue azelastine nasal spray 1 spray per nostril twice daily - Consider re-trial of nasal saline irrigation 1-2x/day - ENT follow-up with Dr. Motley  3. Upper airway resistance syndrome: - Recent diagnostic PSG (July 2023) with normal AHI at 2.3 events/hour. However, respiratory effort-related arousals occurred at mild frequency, consistent with upper airway resistance syndrome. No significant hypoxemia - Note that sleep-disordered breathing events occurred at a greater frequency in the supine position - He has no significant DELBERT symptomatology, except for loud snoring - Pending evaluation with dental for mandibular advancement oral appliance therapy  - Can also try positional therapies, such as the wedge pillow or noodle to remain in the lateral decubitus position  4. History of pneumonia: - Hospitalized in March 2023 with GUZMAN, cough with sputum production, and bibasilar pneumonia/atelectasis on CT chest. He had an extensive cardiac workup that was unremarkable, including echo, nuclear stress test, and cardiac cath. Troponin was initially elevated to 150s, improved to 120s. Noted to have low RAP and PCWP on cardiac cath, suggesting dehydration. He was treated with Augmentin and Z-pack, also completed a medrol dose pack - Repeat CT chest in June 2023 with clear lungs, patent central airways, and interval resolution of bilateral lower lobe opacities. No pleural effusion. No thoracic lymphadenopathy  5. HCM: - Follow-up in 3-6 months or sooner prn

## 2024-03-11 NOTE — HISTORY OF PRESENT ILLNESS
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 He reports feeling well overall. He is adherent with Symbicort 80-4.5 mcg 1 puff twice daily, rinses after use. He develops muscle cramps with 2 puffs twice daily. He remains off montelukast. He reports dyspnea after going up 2 flights of stairs. He recently started boxing, but still develops dyspnea with activity. Denies any wheezing, chest tightness, dyspnea, or coughing. He continues to use azelastine nasal spray PRN, but has not used mometasone with saline rinse. He previously tried Flonase without significant improvement. Pending follow-up with ENT Dr. Motley.  Recent diagnostic PSG (July 2023) with normal AHI at 2.3 events/hour. However, respiratory effort-related arousals occurred at mild frequency, consistent with upper airway resistance syndrome. No significant hypoxemia. Sleep-related breathing events occurred at a greater frequency while in the supine position. He denies excessive daytime sleepiness, which he used to experience previously. No nonrestorative sleep. Girlfriend used to tell him that he snores. No witnessed apneas. No morning headache or dry mouth. Pending dental evaluation for mandibular advancement oral appliance therapy   He was previously hospitalized in March 2023 with GUZMAN, cough with sputum production, and bibasilar pneumonia/atelectasis on CT Chest. Due to his dyspnea on exertion, he had an extensive cardiac workup that was unremarkable, including echo, nuclear stress test, and cardiac cath. Troponin was initially elevated to 150s, improved to 120s. Noted to have low RAP and PCWP on cardiac cath, suggesting dehydration. Given persistent cough and sputum production, he was started on Augmentin and Z-pack, and also completed a medrol dose pack.  PFTs in June 2023 with normal spirometry, normal lung volumes, and normal diffusing capacity. There was no bronchodilator response.  CT Chest June 2023 with clear lungs, patent central airways, and interval resolution of bilateral lower lobe opacities. No pleural effusion. No thoracic lymphadenopathy.

## 2024-03-13 RX ORDER — BUDESONIDE 90 UG/1
90 AEROSOL, POWDER RESPIRATORY (INHALATION)
Qty: 120 | Refills: 3 | Status: DISCONTINUED | COMMUNITY
Start: 2024-03-11 | End: 2024-03-13

## 2024-03-13 RX ORDER — BECLOMETHASONE DIPROPIONATE HFA 80 UG/1
80 AEROSOL, METERED RESPIRATORY (INHALATION)
Qty: 3 | Refills: 3 | Status: ACTIVE | COMMUNITY
Start: 2024-03-13 | End: 1900-01-01

## 2024-03-21 ENCOUNTER — APPOINTMENT (OUTPATIENT)
Dept: ULTRASOUND IMAGING | Facility: IMAGING CENTER | Age: 44
End: 2024-03-21
Payer: COMMERCIAL

## 2024-03-21 ENCOUNTER — OUTPATIENT (OUTPATIENT)
Dept: OUTPATIENT SERVICES | Facility: HOSPITAL | Age: 44
LOS: 1 days | End: 2024-03-21
Payer: COMMERCIAL

## 2024-03-21 DIAGNOSIS — R31.29 OTHER MICROSCOPIC HEMATURIA: ICD-10-CM

## 2024-03-21 PROCEDURE — 76775 US EXAM ABDO BACK WALL LIM: CPT | Mod: 26

## 2024-03-21 PROCEDURE — 76775 US EXAM ABDO BACK WALL LIM: CPT

## 2024-04-01 ENCOUNTER — APPOINTMENT (OUTPATIENT)
Age: 44
End: 2024-04-01

## 2024-05-08 ENCOUNTER — NON-APPOINTMENT (OUTPATIENT)
Age: 44
End: 2024-05-08

## 2024-05-08 ENCOUNTER — APPOINTMENT (OUTPATIENT)
Dept: CARDIOLOGY | Facility: CLINIC | Age: 44
End: 2024-05-08
Payer: COMMERCIAL

## 2024-05-08 VITALS
HEIGHT: 68 IN | HEART RATE: 82 BPM | BODY MASS INDEX: 27.28 KG/M2 | WEIGHT: 180 LBS | SYSTOLIC BLOOD PRESSURE: 123 MMHG | DIASTOLIC BLOOD PRESSURE: 82 MMHG | OXYGEN SATURATION: 98 %

## 2024-05-08 DIAGNOSIS — R00.2 PALPITATIONS: ICD-10-CM

## 2024-05-08 PROCEDURE — 93000 ELECTROCARDIOGRAM COMPLETE: CPT

## 2024-05-08 PROCEDURE — G2211 COMPLEX E/M VISIT ADD ON: CPT

## 2024-05-08 PROCEDURE — 99214 OFFICE O/P EST MOD 30 MIN: CPT

## 2024-05-08 NOTE — REASON FOR VISIT
[Symptom and Test Evaluation] : symptom and test evaluation [FreeTextEntry1] : Pt has been doing well since his last visit.  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 Workout (boxing) still with dyspnea but unclear if deconditioning or pulmonary process. Last year had w/u including ischemic evaluation and cMRI negative for myocarditis or CAD. Has palpitations sporadically, sometimes 1-2 a week and sometimes not for months. Feels like "no heart beat" followed by a strong heart beat 2-3 seconds later. Now with somewhat longer duration of pause. Has dyspnea during that pause. Sx occur primarily at rest.  1. The Zio Patch was placed on the patient in the office. Suspicion is for APC or PVC with compensatory pause followed by hyperdynamic beat. Unclear why pause has lengthened, no conduction disease on baseline ECG. f/u by email 1 month for Zio results.

## 2024-05-08 NOTE — ASSESSMENT
[FreeTextEntry1] : 1. The Zio Patch was placed on the patient in the office. Suspicion is for APC or PVC with compensatory pause followed by hyperdynamic beat. Unclear why pause has lengthened, no conduction disease on baseline ECG. f/u by email 1 month for Zio results.   I spent 45 minutes on Dr. Hills's care, including face to face time, review of previous records from his pulmonary care team, and documentation.

## 2024-09-12 ENCOUNTER — APPOINTMENT (OUTPATIENT)
Dept: PULMONOLOGY | Facility: CLINIC | Age: 44
End: 2024-09-12
Payer: COMMERCIAL

## 2024-09-12 ENCOUNTER — NON-APPOINTMENT (OUTPATIENT)
Age: 44
End: 2024-09-12

## 2024-09-12 VITALS
OXYGEN SATURATION: 97 % | WEIGHT: 178 LBS | SYSTOLIC BLOOD PRESSURE: 132 MMHG | RESPIRATION RATE: 17 BRPM | HEIGHT: 68 IN | HEART RATE: 89 BPM | BODY MASS INDEX: 26.98 KG/M2 | DIASTOLIC BLOOD PRESSURE: 83 MMHG

## 2024-09-12 DIAGNOSIS — J30.9 ALLERGIC RHINITIS, UNSPECIFIED: ICD-10-CM

## 2024-09-12 DIAGNOSIS — R06.83 SNORING: ICD-10-CM

## 2024-09-12 DIAGNOSIS — J45.909 UNSPECIFIED ASTHMA, UNCOMPLICATED: ICD-10-CM

## 2024-09-12 DIAGNOSIS — G47.8 OTHER SLEEP DISORDERS: ICD-10-CM

## 2024-09-12 PROCEDURE — 99214 OFFICE O/P EST MOD 30 MIN: CPT

## 2024-09-12 RX ORDER — ALBUTEROL SULFATE AND BUDESONIDE 90; 80 UG/1; UG/1
90-80 AEROSOL, METERED RESPIRATORY (INHALATION)
Qty: 1 | Refills: 3 | Status: ACTIVE | COMMUNITY
Start: 2024-09-12 | End: 1900-01-01

## 2024-09-12 NOTE — ASSESSMENT
[FreeTextEntry1] : -   Reinier is a 43 year-old male with a history of Asthma, perennial nonallergic rhinitis, GERD, and bilateral lower lobe pneumonia in March 2023 who presents for follow-up. 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 He reports significant improvement in dyspnea with 2 puffs twice daily. Now not short of breath while talking with patients or going up a flight of stairs. He had spirometry that revealed FEV1 3.53 and FVC 4.61 with FEV1/FVC 77% and [id number] 2.95 L/s in June, but then in August improved to FEV1 3.79 L and FVC 4.76 L with FEV1/FVC 80% and [id number] 3.65 L/s, consistent with significant improvement in lung function with higher dose ICS. He remains off montelukast. Denies any wheezing, chest tightness, dyspnea, or coughing. He uses saline mist daily for non-allergic rhinitis. He does not use sinus rinses regularly and stopped using the azelastine and mometasone nasal therapies. He previously tried Flonase without significant improvement. Pending follow-up with ENT Dr. Motley.  CT Chest June 2023 with clear lungs, patent central airways, and interval resolution of bilateral lower lobe opacities. No pleural effusion. No thoracic lymphadenopathy.  PFTs in June 2023 with normal spirometry, normal lung volumes, and normal diffusing capacity. No BD response.  # Moderate persistent asthma: - Continue Qvar 80 mcg 2 puffs twice daily, rinse after use - Recent spirometry in August 2024 with improvement in FEV1 from 3.53 to 3.79 and FVC from 4.61 to 4.79 - Continue to use albuterol or AirSupra inhaler 1-2 puffs every 6 hours as necessary (needs to rinse after using AirSupra) - Recommend repeat PFTs with bronchodilator testing in the spring - Keep off montelukast therapy at this time given improved asthma symptoms - Given controlled asthma symptoms currently, will hold off on checking asthma labs at this time. Consider obtaining CBC with diff, IgE, 25-OH vit D, and serum allergy panel if symptoms worsen in the future - Previous allergy skin testing negative with Dr. Boxer  # Allergic rhinitis: - Continue use of nasal saline mist daily - Consider nasal saline irrigation as necessary - Azelastine nasal spray 1 spray per nostril twice daily as necessary - Consider re-trying mometasone with nasal saline irrigation if rhinitis recurs - ENT follow-up with Dr. Motley  # Upper airway resistance syndrome and primary snoring - Diagnostic PSG from July 2023 with normal AHI at 2.3/hour, but respiratory effort-related arousals occurred at mild frequency, consistent with upper airway resistance syndrome - Recommend dental evaluation for mandibular advancement oral appliance therapy  # History of pneumonia: - Hospitalized in March 2023 with GUZMAN, cough with sputum production, and bibasilar pneumonia/atelectasis on CT chest. He had an extensive cardiac workup that was unremarkable, including echo, nuclear stress test, and cardiac cath. Troponin was initially elevated to 150s, improved to 120s. Noted to have low RAP and PCWP on cardiac cath, suggesting dehydration. He was treated with Augmentin and Z-pack, also completed a medrol dose pack - Repeat CT chest in June 2023 with clear lungs, patent central airways, and interval resolution of bilateral lower lobe opacities. No pleural effusion. No thoracic lymphadenopathy  # HCM: - Follow-up in 3-6 months or sooner prn

## 2024-09-12 NOTE — PHYSICAL EXAM
[No Acute Distress] : no acute distress [Well Nourished] : well nourished [Well Developed] : well developed [Normal Appearance] : normal appearance [No Neck Mass] : no neck mass [Normal Rate/Rhythm] : normal rate/rhythm [Normal Pulses] : normal pulses [Normal S1, S2] : normal s1, s2 [No Murmurs] : no murmurs [No Resp Distress] : no resp distress [No Acc Muscle Use] : no acc muscle use [Clear to Auscultation Bilaterally] : clear to auscultation bilaterally [No Abnormalities] : no abnormalities [Benign] : benign [Not Tender] : not tender [Normal Gait] : normal gait [No Clubbing] : no clubbing [No Cyanosis] : no cyanosis [No Edema] : no edema [FROM] : FROM [Normal Color/ Pigmentation] : normal color/ pigmentation [No Focal Deficits] : no focal deficits [Oriented x3] : oriented x3 [Normal Affect] : normal affect [TextBox_68] : no wheezing or rales

## 2024-09-12 NOTE — HISTORY OF PRESENT ILLNESS
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 He reports significant improvement in dyspnea with 2 puffs twice daily. Now not short of breath while talking with patients or going up a flight of stairs. He had spirometry that revealed FEV1 3.53 and FVC 4.61 with FEV1/FVC 77% and [id number] 2.95 L/s in June, but then in August improved to FEV1 3.79 L and FVC 4.76 L with FEV1/FVC 80% and [id number] 3.65 L/s, consistent with significant improvement in lung function with higher dose ICS. He remains off montelukast. Denies any wheezing, chest tightness, dyspnea, or coughing. He uses saline mist daily for non-allergic rhinitis. He does not use sinus rinses regularly and stopped using the azelastine and mometasone nasal therapies. He previously tried Flonase without significant improvement. Pending follow-up with ENT Dr. Motley.  Recent diagnostic PSG (July 2023) with normal AHI at 2.3 events/hour. However, respiratory effort-related arousals occurred at mild frequency, consistent with upper airway resistance syndrome. No significant hypoxemia. Sleep-related breathing events occurred at a greater frequency while in the supine position. He denies excessive daytime sleepiness, which he used to experience previously. No nonrestorative sleep. Girlfriend used to tell him that he snores. No witnessed apneas. No morning headache or dry mouth. Pending dental evaluation for mandibular advancement oral appliance therapy   He was previously hospitalized in March 2023 with GUZMAN, cough with sputum production, and bibasilar pneumonia/atelectasis on CT Chest. Due to his dyspnea on exertion, he had an extensive cardiac workup that was unremarkable, including echo, nuclear stress test, and cardiac cath. Troponin was initially elevated to 150s, improved to 120s. Noted to have low RAP and PCWP on cardiac cath, suggesting dehydration. Given persistent cough and sputum production, he was started on Augmentin and Z-pack, and also completed a medrol dose pack.  PFTs in June 2023 with normal spirometry, normal lung volumes, and normal diffusing capacity. There was no bronchodilator response.  CT Chest June 2023 with clear lungs, patent central airways, and interval resolution of bilateral lower lobe opacities. No pleural effusion. No thoracic lymphadenopathy.

## 2024-10-29 ENCOUNTER — APPOINTMENT (OUTPATIENT)
Dept: INTERNAL MEDICINE | Facility: CLINIC | Age: 44
End: 2024-10-29

## 2024-10-29 VITALS
BODY MASS INDEX: 27.57 KG/M2 | HEART RATE: 90 BPM | SYSTOLIC BLOOD PRESSURE: 138 MMHG | WEIGHT: 184 LBS | OXYGEN SATURATION: 97 % | DIASTOLIC BLOOD PRESSURE: 88 MMHG | HEIGHT: 68.5 IN

## 2024-10-29 DIAGNOSIS — Z00.00 ENCOUNTER FOR GENERAL ADULT MEDICAL EXAMINATION W/OUT ABNORMAL FINDINGS: ICD-10-CM

## 2024-10-29 DIAGNOSIS — E78.5 HYPERLIPIDEMIA, UNSPECIFIED: ICD-10-CM

## 2024-10-29 DIAGNOSIS — Z23 ENCOUNTER FOR IMMUNIZATION: ICD-10-CM

## 2024-10-29 PROCEDURE — 36415 COLL VENOUS BLD VENIPUNCTURE: CPT

## 2024-10-29 PROCEDURE — G0008: CPT

## 2024-10-29 PROCEDURE — 99396 PREV VISIT EST AGE 40-64: CPT | Mod: 25

## 2024-10-29 PROCEDURE — 90656 IIV3 VACC NO PRSV 0.5 ML IM: CPT

## 2024-10-31 LAB
ALBUMIN SERPL ELPH-MCNC: 4.7 G/DL
ALP BLD-CCNC: 75 U/L
ALT SERPL-CCNC: 18 U/L
ANION GAP SERPL CALC-SCNC: 13 MMOL/L
APPEARANCE: CLEAR
AST SERPL-CCNC: 14 U/L
BACTERIA: NEGATIVE /HPF
BASOPHILS # BLD AUTO: 0.03 K/UL
BASOPHILS NFR BLD AUTO: 0.4 %
BILIRUB DIRECT SERPL-MCNC: 0.1 MG/DL
BILIRUB INDIRECT SERPL-MCNC: 0.2 MG/DL
BILIRUB SERPL-MCNC: 0.3 MG/DL
BILIRUBIN URINE: NEGATIVE
BLOOD URINE: NEGATIVE
BUN SERPL-MCNC: 11 MG/DL
C TRACH RRNA SPEC QL NAA+PROBE: NOT DETECTED
CALCIUM SERPL-MCNC: 10.1 MG/DL
CAST: 0 /LPF
CHLORIDE SERPL-SCNC: 103 MMOL/L
CHOLEST SERPL-MCNC: 193 MG/DL
CO2 SERPL-SCNC: 24 MMOL/L
COLOR: YELLOW
CREAT SERPL-MCNC: 0.89 MG/DL
EGFR: 110 ML/MIN/1.73M2
EOSINOPHIL # BLD AUTO: 0.06 K/UL
EOSINOPHIL NFR BLD AUTO: 0.7 %
EPITHELIAL CELLS: 0 /HPF
ESTIMATED AVERAGE GLUCOSE: 103 MG/DL
FERRITIN SERPL-MCNC: 94 NG/ML
FOLATE SERPL-MCNC: 10.3 NG/ML
GLUCOSE QUALITATIVE U: NEGATIVE MG/DL
GLUCOSE SERPL-MCNC: 92 MG/DL
HBA1C MFR BLD HPLC: 5.2 %
HBV SURFACE AB SER QL: REACTIVE
HBV SURFACE AG SER QL: NONREACTIVE
HCT VFR BLD CALC: 51.3 %
HCV AB SER QL: NONREACTIVE
HCV S/CO RATIO: 0.07 S/CO
HDLC SERPL-MCNC: 44 MG/DL
HGB BLD-MCNC: 16.2 G/DL
HIV1+2 AB SPEC QL IA.RAPID: NONREACTIVE
IMM GRANULOCYTES NFR BLD AUTO: 0.2 %
IRON SATN MFR SERPL: 17 %
IRON SERPL-MCNC: 55 UG/DL
KETONES URINE: NEGATIVE MG/DL
LDLC SERPL CALC-MCNC: 123 MG/DL
LEUKOCYTE ESTERASE URINE: NEGATIVE
LYMPHOCYTES # BLD AUTO: 2.07 K/UL
LYMPHOCYTES NFR BLD AUTO: 24.8 %
MAN DIFF?: NORMAL
MCHC RBC-ENTMCNC: 31.3 PG
MCHC RBC-ENTMCNC: 31.6 G/DL
MCV RBC AUTO: 99 FL
MICROSCOPIC-UA: NORMAL
MONOCYTES # BLD AUTO: 0.61 K/UL
MONOCYTES NFR BLD AUTO: 7.3 %
N GONORRHOEA RRNA SPEC QL NAA+PROBE: NOT DETECTED
NEUTROPHILS # BLD AUTO: 5.56 K/UL
NEUTROPHILS NFR BLD AUTO: 66.6 %
NITRITE URINE: NEGATIVE
NONHDLC SERPL-MCNC: 149 MG/DL
PH URINE: 6.5
PLATELET # BLD AUTO: 321 K/UL
POTASSIUM SERPL-SCNC: 4 MMOL/L
PROT SERPL-MCNC: 7.3 G/DL
PROTEIN URINE: NEGATIVE MG/DL
PSA SERPL-MCNC: 0.63 NG/ML
RBC # BLD: 5.18 M/UL
RBC # FLD: 13.3 %
RED BLOOD CELLS URINE: 1 /HPF
SODIUM SERPL-SCNC: 141 MMOL/L
SOURCE AMPLIFICATION: NORMAL
SPECIFIC GRAVITY URINE: 1.02
T PALLIDUM AB SER QL IA: NEGATIVE
TIBC SERPL-MCNC: 325 UG/DL
TRIGL SERPL-MCNC: 143 MG/DL
TSH SERPL-ACNC: 1.76 UIU/ML
UIBC SERPL-MCNC: 270 UG/DL
UROBILINOGEN URINE: 0.2 MG/DL
VIT B12 SERPL-MCNC: 287 PG/ML
WBC # FLD AUTO: 8.35 K/UL
WHITE BLOOD CELLS URINE: 0 /HPF